# Patient Record
Sex: FEMALE | Race: WHITE | HISPANIC OR LATINO | Employment: PART TIME | URBAN - METROPOLITAN AREA
[De-identification: names, ages, dates, MRNs, and addresses within clinical notes are randomized per-mention and may not be internally consistent; named-entity substitution may affect disease eponyms.]

---

## 2020-10-30 ENCOUNTER — ANNUAL EXAM (OUTPATIENT)
Dept: OBGYN CLINIC | Facility: CLINIC | Age: 35
End: 2020-10-30
Payer: COMMERCIAL

## 2020-10-30 VITALS
TEMPERATURE: 97 F | BODY MASS INDEX: 29.41 KG/M2 | WEIGHT: 183 LBS | DIASTOLIC BLOOD PRESSURE: 70 MMHG | HEIGHT: 66 IN | SYSTOLIC BLOOD PRESSURE: 118 MMHG

## 2020-10-30 DIAGNOSIS — Z01.419 WOMEN'S ANNUAL ROUTINE GYNECOLOGICAL EXAMINATION: Primary | ICD-10-CM

## 2020-10-30 DIAGNOSIS — Z11.4 SCREENING FOR HIV (HUMAN IMMUNODEFICIENCY VIRUS): ICD-10-CM

## 2020-10-30 PROBLEM — G89.29 CHRONIC BILATERAL LOW BACK PAIN WITHOUT SCIATICA: Status: ACTIVE | Noted: 2020-02-21

## 2020-10-30 PROBLEM — M54.50 CHRONIC BILATERAL LOW BACK PAIN WITHOUT SCIATICA: Status: ACTIVE | Noted: 2020-02-21

## 2020-10-30 PROCEDURE — G0145 SCR C/V CYTO,THINLAYER,RESCR: HCPCS | Performed by: NURSE PRACTITIONER

## 2020-10-30 PROCEDURE — 99385 PREV VISIT NEW AGE 18-39: CPT | Performed by: NURSE PRACTITIONER

## 2020-10-30 PROCEDURE — 87624 HPV HI-RISK TYP POOLED RSLT: CPT | Performed by: NURSE PRACTITIONER

## 2020-10-30 RX ORDER — NAPROXEN 500 MG/1
500 TABLET ORAL 2 TIMES DAILY WITH MEALS
COMMUNITY

## 2020-11-03 LAB
HPV HR 12 DNA CVX QL NAA+PROBE: NEGATIVE
HPV16 DNA CVX QL NAA+PROBE: NEGATIVE
HPV18 DNA CVX QL NAA+PROBE: NEGATIVE

## 2020-11-05 LAB
LAB AP GYN PRIMARY INTERPRETATION: NORMAL
Lab: NORMAL

## 2020-12-27 ENCOUNTER — HOSPITAL ENCOUNTER (EMERGENCY)
Facility: HOSPITAL | Age: 35
Discharge: HOME | End: 2020-12-27
Attending: EMERGENCY MEDICINE | Admitting: EMERGENCY MEDICINE
Payer: COMMERCIAL

## 2020-12-27 ENCOUNTER — APPOINTMENT (EMERGENCY)
Dept: RADIOLOGY | Facility: HOSPITAL | Age: 35
End: 2020-12-27
Payer: COMMERCIAL

## 2020-12-27 VITALS
SYSTOLIC BLOOD PRESSURE: 130 MMHG | RESPIRATION RATE: 16 BRPM | OXYGEN SATURATION: 100 % | DIASTOLIC BLOOD PRESSURE: 70 MMHG | HEART RATE: 75 BPM | TEMPERATURE: 98.2 F

## 2020-12-27 DIAGNOSIS — S63.502A SPRAIN OF LEFT WRIST, INITIAL ENCOUNTER: Primary | ICD-10-CM

## 2020-12-27 LAB
EXT PREG TEST URINE: NEGATIVE
EXT. CONTROL ED NAV: NORMAL

## 2020-12-27 PROCEDURE — 73110 X-RAY EXAM OF WRIST: CPT

## 2020-12-27 PROCEDURE — 81025 URINE PREGNANCY TEST: CPT | Performed by: EMERGENCY MEDICINE

## 2020-12-27 PROCEDURE — 99284 EMERGENCY DEPT VISIT MOD MDM: CPT | Performed by: EMERGENCY MEDICINE

## 2020-12-27 PROCEDURE — 99284 EMERGENCY DEPT VISIT MOD MDM: CPT

## 2020-12-27 PROCEDURE — 96372 THER/PROPH/DIAG INJ SC/IM: CPT

## 2020-12-27 RX ORDER — NAPROXEN 500 MG/1
500 TABLET ORAL 2 TIMES DAILY WITH MEALS
Qty: 20 TABLET | Refills: 0 | Status: SHIPPED | OUTPATIENT
Start: 2020-12-27 | End: 2022-02-28 | Stop reason: ALTCHOICE

## 2020-12-27 RX ORDER — NAPROXEN 500 MG/1
500 TABLET ORAL 2 TIMES DAILY WITH MEALS
Qty: 20 TABLET | Refills: 0 | Status: SHIPPED | OUTPATIENT
Start: 2020-12-27 | End: 2020-12-27 | Stop reason: SDUPTHER

## 2020-12-27 RX ORDER — KETOROLAC TROMETHAMINE 30 MG/ML
15 INJECTION, SOLUTION INTRAMUSCULAR; INTRAVENOUS ONCE
Status: COMPLETED | OUTPATIENT
Start: 2020-12-27 | End: 2020-12-27

## 2020-12-27 RX ADMIN — KETOROLAC TROMETHAMINE 15 MG: 30 INJECTION, SOLUTION INTRAMUSCULAR at 14:36

## 2021-08-03 ENCOUNTER — OFFICE VISIT (OUTPATIENT)
Dept: FAMILY MEDICINE CLINIC | Facility: CLINIC | Age: 36
End: 2021-08-03
Payer: COMMERCIAL

## 2021-08-03 VITALS
DIASTOLIC BLOOD PRESSURE: 60 MMHG | OXYGEN SATURATION: 100 % | WEIGHT: 178.4 LBS | BODY MASS INDEX: 29.72 KG/M2 | HEART RATE: 84 BPM | HEIGHT: 65 IN | TEMPERATURE: 99.5 F | SYSTOLIC BLOOD PRESSURE: 100 MMHG | RESPIRATION RATE: 16 BRPM

## 2021-08-03 DIAGNOSIS — Z13.220 SCREENING FOR HYPERLIPIDEMIA: ICD-10-CM

## 2021-08-03 DIAGNOSIS — Z13.1 SCREENING FOR DIABETES MELLITUS (DM): ICD-10-CM

## 2021-08-03 DIAGNOSIS — K08.9 POOR DENTITION: ICD-10-CM

## 2021-08-03 DIAGNOSIS — Z13.0 SCREENING FOR DEFICIENCY ANEMIA: ICD-10-CM

## 2021-08-03 DIAGNOSIS — Z11.4 SCREENING FOR HIV (HUMAN IMMUNODEFICIENCY VIRUS): ICD-10-CM

## 2021-08-03 DIAGNOSIS — Z11.59 NEED FOR HEPATITIS C SCREENING TEST: ICD-10-CM

## 2021-08-03 DIAGNOSIS — Z00.00 ANNUAL PHYSICAL EXAM: Primary | ICD-10-CM

## 2021-08-03 PROCEDURE — 99385 PREV VISIT NEW AGE 18-39: CPT | Performed by: FAMILY MEDICINE

## 2021-08-03 RX ORDER — OXYMETAZOLINE HYDROCHLORIDE 0.05 G/100ML
2 SPRAY NASAL 2 TIMES DAILY
COMMUNITY

## 2021-08-03 NOTE — ASSESSMENT & PLAN NOTE
Pt needs a root canal and need to find a doctor who can preform the surgery in the hospital  Will give name of a periodontist

## 2021-08-03 NOTE — PROGRESS NOTES
[facility] PRACT[facility]    NAME: Lobo   AGE: 39 y o  SEX: female  : 1985     DATE: 8/3/2021     Assessment and Plan:     Problem List Items Addressed This Visit        Digestive    Poor dentition     Pt needs a root canal and need to find a doctor who can preform the surgery in the hospital  Will give name of a periodontist           Relevant Orders    Ambulatory referral to Periodontics    Ambulatory referral to Oral Maxillofacial Surgery       Other    Annual physical exam - Primary     Normal exam    Screening labs order  No family ho colon cancer but patient has a paternal uncle and paternal grandfather with colon cancer at age 76 and 79 respectively  Start screening at age 39  Other Visit Diagnoses     Screening for deficiency anemia        Relevant Orders    CBC and Platelet    Screening for diabetes mellitus (DM)        Relevant Orders    Comprehensive metabolic panel    Screening for hyperlipidemia        Relevant Orders    Lipid panel    Screening for HIV (human immunodeficiency virus)        Relevant Orders    HIV 1/2 Antigen/Antibody (4th Generation) w Reflex [facility]    Need for hepatitis C screening test        Relevant Orders    Hepatitis C antibody          Immunizations and preventive care screenings were discussed with patient today  Appropriate education was printed on patient's after visit summary  Pt completed the pfizer covid vaccines  Counseling:  Alcohol/drug use: discussed moderation in alcohol intake, the recommendations for healthy alcohol use, and avoidance of illicit drug use  Dental Health: discussed importance of regular tooth brushing, flossing, and dental visits  · Exercise: the importance of regular exercise/physical activity was discussed  Recommend exercise 3-5 times per week for at least 30 minutes  BMI Counseling: Body mass index is 29 69 kg/m²   The BMI is above normal  Nutrition recommendations include decreasing portion sizes, encouraging healthy choices of fruits and vegetables and limiting drinks that contain sugar  Exercise recommendations include moderate physical activity 150 minutes/week  Return in 1 year (on 8/3/2022)  Chief Complaint:     Chief Complaint   Patient presents with    New Patient Visit      History of Present Illness:     Adult Annual Physical   Patient here for a comprehensive physical exam  The patient reports problems - see separate note  Diet and Physical Activity  · Diet/Nutrition: well balanced diet  · Exercise: no formal exercise  Depression Screening  PHQ-9 Depression Screening    PHQ-9:   Frequency of the following problems over the past two weeks:      Little interest or pleasure in doing things: 0 - not at all  Feeling down, depressed, or hopeless: 0 - not at all  PHQ-2 Score: 0       General Health  · Sleep: sleeps well  · Hearing: no issues  · Vision: wears glasses  · Dental: pt needs a dentist  She has seen multiple dentists in the area but every one refuses to see her because of her prior reaction to anesthesia  She reports that during prior 2 c sections, her bp droped significantly  She is requesting a referral to a hospital      /GYN Health  · Last menstrual period: regular 7/12/21  · Contraceptive method: tubal ligation  · History of STDs?: no   · Pap was done 10/2020 and was normal with negative HPV  Review of Systems:     Review of Systems   Constitutional: Negative for fever and unexpected weight change  HENT: Negative for ear pain, sore throat and trouble swallowing  Eyes: Negative for pain and visual disturbance  Respiratory: Negative for cough, chest tightness, shortness of breath and wheezing  Cardiovascular: Negative for chest pain  Gastrointestinal: Negative for abdominal distention, abdominal pain, blood in stool, constipation, diarrhea, nausea and vomiting     Endocrine: Negative for polydipsia and polyuria  Genitourinary: Negative for dysuria and hematuria  Musculoskeletal: Negative for back pain and myalgias  Skin: Negative for rash  Neurological: Negative for syncope and headaches  Psychiatric/Behavioral: Negative for suicidal ideas  Past Medical History:     History reviewed  No pertinent past medical history  Past Surgical History:     Past Surgical History:   Procedure Laterality Date     SECTION        Social History:     Social History     Socioeconomic History    Marital status: /Civil Union     Spouse name: None    Number of children: 2    Years of education: None    Highest education level: None   Occupational History    None   Tobacco Use    Smoking status: Never Smoker    Smokeless tobacco: Never Used   Vaping Use    Vaping Use: Never used   Substance and Sexual Activity    Alcohol use: Not Currently     Comment: occasionally    Drug use: Not Currently    Sexual activity: Yes     Partners: Male   Other Topics Concern    None   Social History Narrative    None     Social Determinants of Health     Financial Resource Strain: Low Risk     Difficulty of Paying Living Expenses: Not hard at all   Food Insecurity: No Food Insecurity    Worried About Running Out of Food in the Last Year: Never true    Damien of Food in the Last Year: Never true   Transportation Needs: No Transportation Needs    Lack of Transportation (Medical): No    Lack of Transportation (Non-Medical): No   Physical Activity: Inactive    Days of Exercise per Week: 0 days    Minutes of Exercise per Session: 0 min   Stress: No Stress Concern Present    Feeling of Stress :  Only a little   Social Connections:     Frequency of Communication with Friends and Family:     Frequency of Social Gatherings with Friends and Family:     Attends Muslim Services:     Active Member of Clubs or Organizations:     Attends Club or Organization Meetings:     Marital Status: Intimate Partner Violence: Not At Risk    Fear of Current or Ex-Partner: No    Emotionally Abused: No    Physically Abused: No    Sexually Abused: No      Family History:     Family History   Problem Relation Age of Onset    Hypertension Mother     Thyroid disease Mother     Hyperlipidemia Mother     Diabetes Father     Hypertension Father     Colon cancer Paternal Grandfather     Colon cancer Paternal Uncle     No Known Problems Brother     Cancer Paternal Grandmother       Current Medications:     Current Outpatient Medications   Medication Sig Dispense Refill    oxymetazoline (Afrin 12 Hour) 0 05 % nasal spray 2 sprays by Each Nare route 2 (two) times a day      naproxen (NAPROSYN) 500 mg tablet Take 500 mg by mouth 2 (two) times a day with meals      naproxen (NAPROSYN) 500 mg tablet Take 1 tablet (500 mg total) by mouth 2 (two) times a day with meals (Patient not taking: Reported on 8/3/2021) 20 tablet 0     No current facility-administered medications for this visit  Allergies:     No Known Allergies   Physical Exam:     /60 (BP Location: Left arm, Patient Position: Sitting, Cuff Size: Adult)   Pulse 84   Temp 99 5 °F (37 5 °C) (Tympanic)   Resp 16   Ht 5' 5" (1 651 m)   Wt 80 9 kg (178 lb 6 4 oz)   LMP 07/12/2021   SpO2 100%   BMI 29 69 kg/m²     Physical Exam  Constitutional:       Appearance: She is well-developed  HENT:      Head: Normocephalic and atraumatic  Eyes:      General: No scleral icterus  Conjunctiva/sclera: Conjunctivae normal       Pupils: Pupils are equal, round, and reactive to light  Cardiovascular:      Rate and Rhythm: Normal rate and regular rhythm  Heart sounds: Normal heart sounds  No murmur heard  No friction rub  No gallop  Pulmonary:      Effort: Pulmonary effort is normal  No respiratory distress  Breath sounds: No wheezing or rales  Chest:      Chest wall: No tenderness     Abdominal:      General: Bowel sounds are normal  There is no distension  Palpations: Abdomen is soft  There is no mass  Tenderness: There is no abdominal tenderness  Musculoskeletal:         General: Normal range of motion  Cervical back: Normal range of motion and neck supple  Lymphadenopathy:      Cervical: No cervical adenopathy  Skin:     General: Skin is warm and dry  Capillary Refill: Capillary refill takes less than 2 seconds  Findings: No rash  Neurological:      Mental Status: She is alert and oriented to person, place, and time  Cranial Nerves: No cranial nerve deficit            Brian Knapp MD   [address] [place] [address]

## 2021-08-03 NOTE — ASSESSMENT & PLAN NOTE
Normal exam    Screening labs order  No family ho colon cancer but patient has a paternal uncle and paternal grandfather with colon cancer at age 76 and 79 respectively  Start screening at age 39

## 2021-08-03 NOTE — PATIENT INSTRUCTIONS

## 2021-12-29 DIAGNOSIS — Z20.822 COVID-19 RULED OUT: Primary | ICD-10-CM

## 2021-12-30 PROCEDURE — U0005 INFEC AGEN DETEC AMPLI PROBE: HCPCS | Performed by: FAMILY MEDICINE

## 2021-12-30 PROCEDURE — U0003 INFECTIOUS AGENT DETECTION BY NUCLEIC ACID (DNA OR RNA); SEVERE ACUTE RESPIRATORY SYNDROME CORONAVIRUS 2 (SARS-COV-2) (CORONAVIRUS DISEASE [COVID-19]), AMPLIFIED PROBE TECHNIQUE, MAKING USE OF HIGH THROUGHPUT TECHNOLOGIES AS DESCRIBED BY CMS-2020-01-R: HCPCS | Performed by: FAMILY MEDICINE

## 2022-01-02 NOTE — PROGRESS NOTES
Pt had a covid contact on Christmas  She had a sore throat on the 27th and 28th and tested positive on the 30th  Her symptoms resolved  She is back at work

## 2022-02-28 NOTE — PROGRESS NOTES
Assessment/Plan:    Seasonal allergic rhinitis  Patient has seasonal allergic rhinitis  No specific trigger  Worse in the past 2 weeks  Discontinue afrin nasal spray  Advised patient to start flonase nasal spray, 2 sprays in each nostril daily  Start otc allergy medication such as claritin, allergra, or zyrtec  Follow up in 1 month or sooner as needed    Tension headache  Patient having tension headaches  This may be contributed by seasonal allergic rhinitis  Will refer to physical therapy for stretching exercises  Also printed out stretching exercises patient dry  On advised patient that headaches are due to muscular tension/stress  Patient needs to stretch these out  Take Tylenol extra-strength 2 tabs t i d  p r n  for pain or discomfort  Follow-up in 1 month with PCP or sooner as needed  Diagnoses and all orders for this visit:    Tension headache  -     Ambulatory Referral to Physical Therapy; Future    Seasonal allergic rhinitis due to other allergic trigger          Subjective:   Chief Complaint   Patient presents with    Headache     Health Maintenance   Topic Date Due    Hepatitis C Screening  Never done    HIV Screening  Never done    DTaP,Tdap,and Td Vaccines (1 - Tdap) Never done    Influenza Vaccine (1) Never done    COVID-19 Vaccine (3 - Booster for Pfizer series) 12/12/2021    BMI: Followup Plan  08/03/2022    Annual Physical  08/03/2022    Depression Screening  03/02/2023    BMI: Adult  03/02/2023    Cervical Cancer Screening  10/30/2025    Pneumococcal Vaccine: Pediatrics (0 to 5 Years) and At-Risk Patients (6 to 59 Years)  Aged Out    HIB Vaccine  Aged Out    Hepatitis B Vaccine  Aged Out    IPV Vaccine  Aged Out    Hepatitis A Vaccine  Aged Out    Meningococcal ACWY Vaccine  Aged Out    HPV Vaccine  Aged Out        Patient ID: Jeramie Golden is a 39 y o  female  HPI     # [id number]    Patient presents with complaints of headaches      C/o headaches x 2 weeks  Area of pain all over  Intermittent  Lasting minutes to hours  Taking tylenol with minimal improvement  No similar episodes in the past  Works as a   No medications  The following portions of the patient's history were reviewed and updated as appropriate: allergies, current medications, past family history, past medical history, past social history, past surgical history, and problem list     Review of Systems   Constitutional: Negative for chills and fever  HENT: Positive for sinus pressure  Negative for congestion  Eyes: Negative for visual disturbance  Respiratory: Negative for cough and shortness of breath  Cardiovascular: Negative for chest pain and palpitations  Gastrointestinal: Negative for nausea and vomiting  Genitourinary: Negative for dysuria  Musculoskeletal: Negative for myalgias  Skin: Negative for rash  Neurological: Positive for headaches  Negative for dizziness  Objective:  /72 (BP Location: Left arm, Patient Position: Sitting, Cuff Size: Adult)   Pulse 79   Temp (!) 97 °F (36 1 °C) (Tympanic)   Resp 17   Ht 5' 5" (1 651 m)   Wt 83 kg (183 lb)   SpO2 98%   BMI 30 45 kg/m²      Physical Exam  Vitals and nursing note reviewed  Constitutional:       General: She is not in acute distress  HENT:      Head: Normocephalic and atraumatic  Comments: Ethmoid sinus pressure on palpation  Eyes:      Conjunctiva/sclera: Conjunctivae normal    Cardiovascular:      Rate and Rhythm: Normal rate and regular rhythm  Pulses: Normal pulses  Heart sounds: No murmur heard  Pulmonary:      Effort: Pulmonary effort is normal  No respiratory distress  Breath sounds: No wheezing, rhonchi or rales  Musculoskeletal:         General: No swelling  Comments: Hypertonic trapezius muscles and paraspinal muscles in the upper thoracic and cervical regions   Lymphadenopathy:      Cervical: No cervical adenopathy  Neurological:      Mental Status: She is alert  This note has been constructed using a voice recognition system  There may be translation, syntax, or grammatical errors  If you have an questions, please contact the dictating provider

## 2022-03-02 ENCOUNTER — OFFICE VISIT (OUTPATIENT)
Dept: FAMILY MEDICINE CLINIC | Facility: CLINIC | Age: 37
End: 2022-03-02
Payer: COMMERCIAL

## 2022-03-02 VITALS
RESPIRATION RATE: 17 BRPM | DIASTOLIC BLOOD PRESSURE: 72 MMHG | OXYGEN SATURATION: 98 % | HEART RATE: 79 BPM | SYSTOLIC BLOOD PRESSURE: 110 MMHG | WEIGHT: 183 LBS | TEMPERATURE: 97 F | BODY MASS INDEX: 30.49 KG/M2 | HEIGHT: 65 IN

## 2022-03-02 DIAGNOSIS — G44.209 TENSION HEADACHE: Primary | ICD-10-CM

## 2022-03-02 DIAGNOSIS — J30.89 SEASONAL ALLERGIC RHINITIS DUE TO OTHER ALLERGIC TRIGGER: ICD-10-CM

## 2022-03-02 PROBLEM — T78.40XA ALLERGIC: Status: ACTIVE | Noted: 2022-03-02

## 2022-03-02 PROBLEM — J30.2 SEASONAL ALLERGIC RHINITIS: Status: ACTIVE | Noted: 2022-03-02

## 2022-03-02 PROCEDURE — 99213 OFFICE O/P EST LOW 20 MIN: CPT | Performed by: FAMILY MEDICINE

## 2022-03-02 NOTE — PATIENT INSTRUCTIONS
Please start flonase nasal spray and over the counter allergy medication like zyrtec, clartin or allergra and take daily  You may take tylenol extra strength 500 mg 2 tabs three times a day as needed for headache or pain  Dolor de perico tensional, cuidados ambulatorios   INFORMACIÓN GENERAL:   Un dolor de perico tensional  por lo general es causado por tensión muscular en cavazos perico o alberto que puede durar 30 minutos hasta varios días  A pesar de que el dolor es incómodo, los radha de perico tensionales por lo general no causan ningún problema wong  Lo siguiente puede causar tensión muscular y desencadenar un dolor de perico tensional:  · La fatiga ocular o melisa francisco postura    · Problemas de la mandíbula o dentales tigre melisa de la articulación temporomandibular, apretar la mandíbula o rechinar los dientes    · Actividades que causan cavaozs perico se mantenga en melisa posición por mucho tiempo    · Pasarse melisa comida    · No dormir lo suficiente, apnea de sueño (periodos cortos de falta de respiración)     · Sensibilidad a los alimentos, tigre el gluten  Síntomas comunes incluyen los siguientes:   · Dolor sordo y vinod sobre davian ojos y en la parte trasera de cavazos perico    · Dolor de perico que se empeora a medida que avanza el día     · Dolor que se propaga sobre toda cavazos perico al igual que cavazos alberto y hombros    · Rigidez de los músculos del alberto y los hombros    · Dolor de perico que se empeora con la fawad brillante o ruidos duros  Busque atención inmediata al presentar los siguientes síntomas:   · Un dolor de perico repentino que parece diferente o mucho peor     · Dificultad para lázaro, hablar o al [employer]    · Confusión o melisa sensación tigre si se fuera a desmayar    · Dolor de [place], fiebre o rigidez en el alberto  El tratamiento para el dolor de perico tensional  depende de la causa de cavazos dolor de [place]  Es posible que necesite medicamentos para disminuir cavazos dolor de [place]   [address] instrucciones  También podría necesitar acudir al proveedor de suzi que se especializa en problemas de espalda, músculos o mandíbula  Lidiar con mis síntomas:   · Lleve un registro de los radha de [place]  [place] Blonder cuando [place] y termino cavazos dolor de [place]  Incluya los síntomas y lo que estaba haciendo cuando empezó el dolor de [place]  Escriba en el registro lo que usted comió o bebió bella las 24 horas previas al inicio de cavazos dolor de [place]  Delores Bennett dolor y dónde le duele  Registre lo que hizo para tratar cavazos dolor de perico y si funciono o no  · Aplique calor  en cavazos perico bella 20 a 30 minutos cada 2 horas por los [employer] indicaron  El calor ayuda a disminuir el dolor y los espasmos musculares  Se puede alternar entre el calor y el hielo  · Aplique hielo  en cavazos perico por 15 a 20 minutos cada hora según las indicaciones  Use melisa compresa de hielo o coloque hielo triturado en melisa bolsa plástica y cúbrala con melisa toalla  El hielo disminuye el dolor  Prevenir un dolor de perico tensional:   · Evite la tensión muscular  No se quede en melisa posición por [employer]  Use melisa almohada diferente si se despierta con el alberto o los músculos de los hombros adoloridos  Busque formas para relajar davian músculos, tigre un masaje o descansar en silencio en melisa habitación oscura  · Evite la fatiga ocular  Asegúrese que tiene buena fawad al leer, cocer o realizar actividades parecidas  Acuda a que [employer] años y use gafas según le indicaron  · Consuma melisa variedad de alimentos saludables  Los alimentos saludables incluyen frutas, verduras, panes integrales, productos lácteos descremados, frijoles, carne magra y pescado  Evite alimentos que le producen el dolor de [place]  · Realice actividades físicas con regularidad  El ejercicio ayuda a disminuir el estrés y los radha de [place]  Consulte sobre cual es el mejor plan de ejercicios para usted  · No consuma alcohol  El alcohol puede desencadenar un dolor de [place]  El alcohol puede también interferir con los medicamentos que se usan para tratar cavazos dolor de [place]  · No fume  Si usted fuma, nunca es tarde para dejar de fumar  El humo del tabaco puede desencadenar un dolor de [place]  Solicite más información si necesita ayuda para dejar de fumar  Acuda a cavazos aurelia de control con cavazos proveedor de suzi según le indicaron:  Lleve cavazos registro de los radha de perico cuando acuda a la aurelia con cavazos proveedor de suzi  Escriba las preguntas que tenga para que no olvide hacerlas bella las citas médicas  ACUERDOS SOBRE CAVAZOS CUIDADO:   Usted tiene el derecho de participar en la planificación de cavazos cuidado  Aprenda todo lo que pueda sobre cavazos condición y tigre darle tratamiento  Discuta con davian médicos davian opciones de tratamiento para juntos decidir el cuidado que usted quiere recibir  Usted siempre tiene el derecho a rechazar cavazos tratamiento  Esta información es sólo para uso en educación  Cavazos intención no es darle un consejo médico sobre enfermedades o tratamientos  Colsulte con cavazos Chely Poncho farmacéutico antes de seguir cualquier régimen médico para saber si es seguro y efectivo para usted  © 2014 [address] is for End User's use only and may not be sold, redistributed or otherwise used for commercial purposes  All illustrations and images included in CareNotes® are the copyrighted property of A D A M , Inc  or Moe Moss  Ejercicios para el alberto   CUIDADO AMBULATORIO:   Los ejercicios para el alberto ayudan a reducir el dolor de alberto y, al [employer], lo fortalecen y mejoran cavazos movimiento  Los ejercicios para el alberto también ayudan a prevenir problemas de alberto a Sarah Jetty  Lo que necesita saber acerca de los ejercicios para el alberto:  · Ryne los ejercicios a diario o con la frecuencia indicada por cavazos médico     · Muévase lentamente, con cuidado y suavemente   Evite movimientos rápidos o bruscos  · Póngase de pie y siéntese de la forma que cavazos médico le ha [place]  La buena postura puede llegar a reducir cavazos dolor de alberto  Revise cavazos postura con frecuencia, aún cuando no esté haciendo davian ejercicios de alberto  Cómo realizar ejercicios para el alberto de manera david:  · Posición de ejercicio: Puede sentarse o estar parado mientras realiza los ejercicios para el alberto  [place] de frente  Los hombros deben estar rectos y [place], con [facility]  · Inclinación de Pernell Chavo y [place] atrás: Incline cavazos perico suavemente tratando que cavazos mentón toque cavazos pecho  Cavazos médico puede incluso pedirle que empuje la parte de atrás de cavazos alberto para ayudar a inclinar cavazos perico  Levante cavazos barbilla hasta la posición inicial  Incline cavazos perico hacia atrás lo más que pueda de [place] que quede mirando hacia el matheus raso  Es posible que cavazos médico le pida que levante el mentón para así ayudar a inclinar cavazos Maribel Mount atrás  Regrese cavazos perico a la posición inicial          · Inclinaciones de perico, de lado a lado: Incline cavazos perico de manera que cavazos oreja quede cerca de cavazos hombro  Luego incline cavazos perico hacia cavazos otro hombro  · Giros de perico: Gire cavazos perico tigre si fuera a mirar sobre el hombro  Incline cavazos mentón hacia abajo y trate de que toque cavazos hombro  No levante cavazos hombro hasta que toque cavazos mentón  [place]bey de frente otra vez  Ryne lo mismo del otro lado  · Giros de perico: Despacio, lleve la barbilla hacia el pecho  A continuación, gire la perico hacia la derecha  La oreja debe quedar ubicada por encima del hombro  Mantenga esta posición por 5 segundos  Gire la perico otra vez hacia el pecho y, [place], hacia la izquierda a la misma posición  Sostenga está posición por 5 segundos  Con cuidado, gire la perico hacia atrás en círculo en el sentido de las manecillas del [place] [address] y repita 3 veces   A continuación, mueve la perico en la dirección contraria (en el sentido contrario de las manecillas del reloj) en círculo 3 veces  No encoja los hombros hacia arriba mientras realiza derrell ejercicio  Comuníquese con cavazos médico si:  · Cavazos dolor no mejora o [place]  · Usted tiene preguntas o inquietudes acerca de cavazos afección, cuidado o programa de ejercicios  © Copyright [employer] [facility] 2022 Information is for End User's use only and may not be sold, redistributed or otherwise used for commercial purposes  All illustrations and images included in CareNotes® are the copyrighted property of [employer]  or [address] [place] [address] es sólo para uso en educación  Cavazos intención no es darle un consejo médico sobre enfermedades o tratamientos  Colsulte con cavazos Selam Otto farmacéutico antes de seguir cualquier régimen médico para saber si es seguro y efectivo para usted

## 2022-03-02 NOTE — ASSESSMENT & PLAN NOTE
Patient has seasonal allergic rhinitis  No specific trigger  Worse in the past 2 weeks  Discontinue afrin nasal spray  Advised patient to start flonase nasal spray, 2 sprays in each nostril daily  Start otc allergy medication such as claritin, allergra, or zyrtec  Follow up in 1 month or sooner as needed

## 2022-03-02 NOTE — ASSESSMENT & PLAN NOTE
Patient having tension headaches  This may be contributed by seasonal allergic rhinitis  Will refer to physical therapy for stretching exercises  Also printed out stretching exercises patient dry  On advised patient that headaches are due to muscular tension/stress  Patient needs to stretch these out  Take Tylenol extra-strength 2 tabs t i d  p r n  for pain or discomfort  Follow-up in 1 month with PCP or sooner as needed

## 2022-03-08 ENCOUNTER — EVALUATION (OUTPATIENT)
Dept: PHYSICAL THERAPY | Facility: CLINIC | Age: 37
End: 2022-03-08
Payer: COMMERCIAL

## 2022-03-08 DIAGNOSIS — M54.2 CERVICAL PAIN: Primary | ICD-10-CM

## 2022-03-08 DIAGNOSIS — G44.209 TENSION HEADACHE: ICD-10-CM

## 2022-03-08 PROCEDURE — 97161 PT EVAL LOW COMPLEX 20 MIN: CPT

## 2022-03-08 PROCEDURE — 97140 MANUAL THERAPY 1/> REGIONS: CPT

## 2022-03-08 PROCEDURE — 97110 THERAPEUTIC EXERCISES: CPT

## 2022-03-08 NOTE — PROGRESS NOTES
PT Evaluation     Today's date: 3/9/2022  Patient name: Thu Valdez  : 1985  MRN: [id number]  Referring provider: Samuel Lopez DO  Dx:   Encounter Diagnosis     ICD-10-CM    1  Cervical pain  M54 2 PT plan of care cert/re-cert   2  Tension headache  G44 209 Ambulatory Referral to Physical Therapy     PT plan of care cert/re-cert       Start Time: 1800  Stop Time: 1855  Total time in clinic (min): 55 minutes    Assessment  Assessment details: Patient presents to outpatient physical therapy with cervical pain and headache  She presents with impairments of decreased cervical ROM, decreased joint mobility in cervical and thoracic spins, pain, decreased scapular stability and strength, impaired posture, muscle hypertonicity and decreased flexibility, and decreased cervical strength  Patient presents with decreased strength at middle trapezius and lower trapezius, as well as decrease DNF endurance  Patient would benefit from skilled physical therapy to decrease pain, increase functional ROM, improve function, improve QOL and return to prior level of functioning  Impairments: abnormal muscle tone, abnormal or restricted ROM, impaired physical strength, lacks appropriate home exercise program, pain with function and poor posture   Understanding of Dx/Px/POC: good   Prognosis: good    Goals  ST  Decrease subjective pain to 6/10 at worst in 4 weeks to improve function  2  Improve cervical ROM to WNL within 4 weeks   3  Improve scapular strength to 4-/5 to improve posture and decrease strain on cervical musculature in 4 weeks    LT  Decrease subjective pain to 3/10 at worst in 8 weeks to improve function  2  Improve scapular strength to 4/5  to improve posture and decrease strain on cervical musculature in 8 weeks  3   Patient will be independent in [facility]  Patient would benefit from: skilled physical therapy  Planned therapy interventions: joint mobilization, manual therapy, massage, neuromuscular re-education, patient education, postural training, strengthening, stretching, flexibility, therapeutic activities, therapeutic exercise and home exercise program  Frequency: 2x week  Duration in visits: 8  Plan of Care beginning date: 3/8/2022  Plan of Care expiration date: 5/3/2022  Treatment plan discussed with: patient        Subjective Evaluation    History of Present Illness  Mechanism of injury: Patient reports that she feels pain in the back of the neck and back of the head, sometimes causing headaches  Patient reports it started about 2 months ago  She states that she had she had this pain awhile ago, went away and has returned and has come and gone for a few years  Headaches occurring have been getting better, but the pain in the neck continues  She states that if she needs to do a lot of things and hurry, more stress makes is worse  "doing too many things at a time" makes the pain worse  She states the doctor stated that her seasonal allergies may be contributing to the pain, has been taking allergy medication (Claritin and nose spray), has helped the headaches  No HA in the past 2 weeks  Sometimes feels terrible, and sometimes more uncomfortable  Increased pain if she does not sleep well  Works as a   Pain  Current pain ratin  At best pain ratin  At worst pain ratin  Relieving factors: relaxation and rest  Aggravating factors: sitting          Objective     Concurrent Complaints  Positive for headaches  Negative for night pain, disturbed sleep, dizziness, faints and nausea/motion sickness    Additional Special Questions  No numbness or tingling or pain in UE's    Palpation   Left   Hypertonic in the scalenes, suboccipitals and upper trapezius  Tenderness of the scalenes, suboccipitals and upper trapezius  Right   Hypertonic in the scalenes, suboccipitals and upper trapezius  Tenderness of the scalenes, suboccipitals and upper trapezius  Tenderness   Cervical Spine   Tenderness in the facet joint and spinous process  Active Range of Motion     Additional Active Range of Motion Details  Flexion WNL  Extension WNL  R SB: 20 degrees pain  L SB: 18 degrees pain  R rotation: 60 degrees  L rotation: 55 degrees, pain    Joint Play     Hypomobile: C3, C4, C5, C6, C7, T1, T2, T3, T4, T5 and T6     Pain: C3, C4, C5, C6, C7, T1, T2, T3, T4, T5 and T6     Strength/Myotome Testing     Left Shoulder     Isolated Muscles   Lower trapezius: 3-   Middle trapezius: 3+     Right Shoulder     Isolated Muscles   Lower trapezius: 3-   Middle trapezius: 3+     Tests   Cervical   Positive neck flexor muscle endurance test   Negative cervical distraction  Additional Tests Details  DNF endurance 7 seconds, significant accessory muscle activation  Neuro Exam:     Headaches   Patient reports headaches: Yes  Posture assessment: Patient with FHP, rounded shoulders, excessive upper cervical extension, decreased thoracic kyphosis, increased kyphosis at upper thoracic spine  Precautions: none      Manuals 3/8             cervical distraction, SOR, jt mobs lat glide C4-C6 bilateral                                                   Neuro Re-Ed             Chin tuck with retraction 5" x 10                                                                                          Ther Ex             Upper trap stretch 30" x 3            Lev scap stretch 30" x 3                                                                                          Ther Activity                                       Gait Training                                       Modalities                                           Access Code: [email or web address]  URL: [email or web address]  Date: 03/08/2022  Prepared by: Cyndi Galan    Exercises  · Seated Levator Scapulae Stretch - 2 x daily - 7 x weekly - 3 sets - 30 hold  · Seated Upper Trapezius Stretch - 2 x daily - 7 x weekly - 3 sets - 30 hold  · Supine Chin Tuck - 2 x daily - 7 x weekly - 2 sets - 10 reps

## 2022-03-15 ENCOUNTER — OFFICE VISIT (OUTPATIENT)
Dept: PHYSICAL THERAPY | Facility: CLINIC | Age: 37
End: 2022-03-15
Payer: COMMERCIAL

## 2022-03-15 DIAGNOSIS — G44.209 TENSION HEADACHE: ICD-10-CM

## 2022-03-15 DIAGNOSIS — M54.2 CERVICAL PAIN: Primary | ICD-10-CM

## 2022-03-15 PROCEDURE — 97110 THERAPEUTIC EXERCISES: CPT

## 2022-03-15 PROCEDURE — 97140 MANUAL THERAPY 1/> REGIONS: CPT

## 2022-03-15 PROCEDURE — 97112 NEUROMUSCULAR REEDUCATION: CPT

## 2022-03-15 NOTE — PROGRESS NOTES
Daily Note     Today's date: 3/15/2022  Patient name: Dakotah Eaton  : 1985  MRN: [id number]  Referring provider: Natali Lopez DO  Dx:   Encounter Diagnosis     ICD-10-CM    1  Cervical pain  M54 2    2  Tension headache  G44 209        Start Time: 1700  Stop Time: 1800  Total time in clinic (min): 60 minutes    Subjective: Patient reports she was having more pain after the initial session, felt better after that  She states that the pain is better  She states that she had a lot of pain when trying to sleep the night after she came for therapy  She states that she has pain when looking R or L, not as bad when looking up and down  Objective: See treatment diary below      Assessment: Tolerated treatment well  Patient demonstrated fatigue post treatment, exhibited good technique with therapeutic exercises and would benefit from continued PT  Patient with sensitivity with MFR and joint mobs to cervical spine  Decreased pain with cervical retraction  Trialed IASTM to bilateral upper traps, tolerated well  Patient reported decreased pain post session, fatigue in neck  Requires cues to decreased upper trap activation with rows  Continue to progress as tolerated  Plan: Continue per plan of care        Precautions: none      Manuals 3/8 3/15            cervical distraction, SOR, jt mobs lat glide C4-C6 bilateral Cervical distraction, SOR, jt mobs lat glide R C4-C6 grade II    Gentle IASTM to bilateral UT in sitting                                                  Neuro Re-Ed             Chin tuck with retraction 5" x 10            rows  Org TB x 15 reps           Low rows             Cervical retraction in sitting  5" x 10                                                  Ther Ex             Upper trap stretch 30" x 3 30" x 3 each           Lev scap stretch 30" x 3 30" x 3 each           Prone I, T, W             Upper thoracic extension seated  5" hold, 2 x 10           Open book             Cervical SNAGS 5" hold x 10 each           SCM stretch  30" x 2 (sidebend with opposite rotation)                        Ther Activity                                       Gait Training                                       Modalities                                         Access Code: [email or web address]  URL: [email or web address]  Date: 03/15/2022  Prepared by: Desiree Servant    Exercises  · Seated Levator Scapulae Stretch - 2 x daily - 7 x weekly - 3 sets - 30 hold  · Seated Upper Trapezius Stretch - 2 x daily - 7 x weekly - 3 sets - 30 hold  · Supine Chin Tuck - 2 x daily - 7 x weekly - 2 sets - 10 reps  · Seated Assisted Cervical Rotation with Towel - 2 x daily - 7 x weekly - 10 reps - 5 hold  · Seated Thoracic Lumbar Extension with Pectoralis Stretch - 2 x daily - 7 x weekly - 2 sets - 10 reps  · Sternocleidomastoid Stretch - 2 x daily - 7 x weekly - 3 sets - 30 hold  · Seated Cervical Retraction - 1 x daily - 7 x weekly - 2 sets - 10 reps - 5 hold

## 2022-03-16 ENCOUNTER — OFFICE VISIT (OUTPATIENT)
Dept: PHYSICAL THERAPY | Facility: CLINIC | Age: 37
End: 2022-03-16
Payer: COMMERCIAL

## 2022-03-16 DIAGNOSIS — M54.2 CERVICAL PAIN: ICD-10-CM

## 2022-03-16 DIAGNOSIS — G44.209 TENSION HEADACHE: Primary | ICD-10-CM

## 2022-03-16 PROCEDURE — 97112 NEUROMUSCULAR REEDUCATION: CPT

## 2022-03-16 PROCEDURE — 97140 MANUAL THERAPY 1/> REGIONS: CPT

## 2022-03-16 PROCEDURE — 97110 THERAPEUTIC EXERCISES: CPT

## 2022-03-16 NOTE — PROGRESS NOTES
Daily Note     Today's date: 3/16/2022  Patient name: Noble Lovett  : 1985  MRN: [id number]  Referring provider: Jered Lopez DO  Dx:   Encounter Diagnosis     ICD-10-CM    1  Tension headache  G44 209    2  Cervical pain  M54 2                   Subjective: Patient reports she felt better after yesterday's session, states she slept well last night  Some soreness    Objective: See treatment diary below      Assessment: Tolerated treatment well  Patient demonstrated fatigue post treatment, exhibited good technique with therapeutic exercises and would benefit from continued PT  Patient continues to have sensitivity with MFR and joint mobs to cervical spine, slight decrease at L side compared to previous session  Sensitivity with IASTM to upper trap and lev scap bilaterally with moderate restrictions  Continues to require tactile cues to avoid shoulder shrug with exercises and with avoid looking down with exercises  Continue to progress as tolerated  Plan: Continue per plan of care        Precautions: none      Manuals 3/8 3/15 3/16         cervical distraction, SOR, jt mobs lat glide C4-C6 bilateral Cervical distraction, SOR, jt mobs lat glide R C4-C6 grade II    Gentle IASTM to bilateral UT in sitting Cervical distraction, jt mobs lat glide R C4-C6 grade II    Gentle IASTM to bilateral UT and lev scap in sitting                                         Neuro Re-Ed           Chin tuck with retraction 5" x 10          rows  Org TB x 15 reps Org TB x 20        TB diagonals   D2 ext org TB x 10 each        Low rows   Org TB x 20        Cervical retraction in sitting  5" x 10                                          Ther Ex           Upper trap stretch 30" x 3 30" x 3 each 30" x 3 each        Lev scap stretch 30" x 3 30" x 3 each 30" x 3 each        Prone I, T, W   Prone I's 3" hold x 15    T's 3" hold x 15 reps        Upper thoracic extension seated  5" hold, 2 x 10         Open book   5" x 10 each side Cervical SNAGS  5" hold x 10 each         SCM stretch  30" x 2 (sidebend with opposite rotation) 30" x 3 each                   Ther Activity                                 Gait Training                                 Modalities                                   Access Code: [email or web address]  URL: [email or web address]  Date: 03/15/2022  Prepared by: Cyndi Galan    Exercises  · Seated Levator Scapulae Stretch - 2 x daily - 7 x weekly - 3 sets - 30 hold  · Seated Upper Trapezius Stretch - 2 x daily - 7 x weekly - 3 sets - 30 hold  · Supine Chin Tuck - 2 x daily - 7 x weekly - 2 sets - 10 reps  · Seated Assisted Cervical Rotation with Towel - 2 x daily - 7 x weekly - 10 reps - 5 hold  · Seated Thoracic Lumbar Extension with Pectoralis Stretch - 2 x daily - 7 x weekly - 2 sets - 10 reps  · Sternocleidomastoid Stretch - 2 x daily - 7 x weekly - 3 sets - 30 hold  · Seated Cervical Retraction - 1 x daily - 7 x weekly - 2 sets - 10 reps - 5 hold

## 2022-03-24 ENCOUNTER — APPOINTMENT (OUTPATIENT)
Dept: PHYSICAL THERAPY | Facility: CLINIC | Age: 37
End: 2022-03-24
Payer: COMMERCIAL

## 2022-03-25 ENCOUNTER — OFFICE VISIT (OUTPATIENT)
Dept: PHYSICAL THERAPY | Facility: CLINIC | Age: 37
End: 2022-03-25
Payer: COMMERCIAL

## 2022-03-25 DIAGNOSIS — M54.2 CERVICAL PAIN: Primary | ICD-10-CM

## 2022-03-25 DIAGNOSIS — G44.209 TENSION HEADACHE: ICD-10-CM

## 2022-03-25 PROCEDURE — 97112 NEUROMUSCULAR REEDUCATION: CPT

## 2022-03-25 PROCEDURE — 97110 THERAPEUTIC EXERCISES: CPT

## 2022-03-25 PROCEDURE — 97140 MANUAL THERAPY 1/> REGIONS: CPT

## 2022-03-25 NOTE — PROGRESS NOTES
Daily Note     Today's date: 3/25/2022  Patient name: Stormy Burden  : 1985  MRN: [id number]  Referring provider: Ana Poster John,   Dx:   Encounter Diagnosis     ICD-10-CM    1  Cervical pain  M54 2    2  Tension headache  G44 209        Start Time: 1240  Stop Time: 1330  Total time in clinic (min): 50 minutes    Subjective: Patient reports she is feeling better, mostly discomfort on the R side  The stretches has been helping  Objective: See treatment diary below      Assessment: Tolerated treatment well  Patient demonstrated fatigue post treatment, exhibited good technique with therapeutic exercises and would benefit from continued PT  Patient with improved tolerance to joint mobilization and MFR  Improving joint mobility at cervical spine  Added additional postural exercises and scapular strengthening/neuromuscular control  Continue to progress as tolerated  Patient continues to have sensitivity with MFR and joint mobs to cervical spine, slight decrease at L side compared to previous session  Sensitivity with IASTM to upper trap and lev scap bilaterally with moderate restrictions  Continues to require tactile cues to avoid shoulder shrug with exercises and with avoid looking down with exercises  Continue to progress as tolerated  Plan: Continue per plan of care        Precautions: none      Manuals 3/8 3/15 3/16 3/25        cervical distraction, SOR, jt mobs lat glide C4-C6 bilateral Cervical distraction, SOR, jt mobs lat glide R C4-C6 grade II    Gentle IASTM to bilateral UT in sitting Cervical distraction, jt mobs lat glide R C4-C6 grade II    Gentle IASTM to bilateral UT and lev scap in sitting Cervical distraction, jt mobs R C4-C6 grade III    Gentle IASTM to R UT and lev scap in sitting                                        Neuro Re-Ed           Chin tuck with retraction 5" x 10   Seated 5" 2 x 10       rows  Org TB x 15 reps Org TB x 20        TB diagonals   D2 ext org TB x 10 each Org TB 2 x 10       Low rows   Org TB x 20        Cervical retraction in sitting  5" x 10         Foam roller on wall roll ups    2 x 10       No money    Org 2 x 10       scap stab    Org TB at wrist 90 degree raise 2 x 10       Ther Ex           Upper trap stretch 30" x 3 30" x 3 each 30" x 3 each        Lev scap stretch 30" x 3 30" x 3 each 30" x 3 each        Prone I, T, W   Prone I's 3" hold x 15    T's 3" hold x 15 reps        Upper thoracic extension seated  5" hold, 2 x 10  5" hold 2 x 10       Open book   5" x 10 each side        Cervical SNAGS  5" hold x 10 each         SCM stretch  30" x 2 (sidebend with opposite rotation) 30" x 3 each        Thread the needle    x 10 each       Ther Activity                                 Gait Training                                 Modalities                                   Access Code: [email or web address]  URL: [email or web address]  Date: 03/15/2022  Prepared by: Ashley Dumont    Exercises  · Seated Levator Scapulae Stretch - 2 x daily - 7 x weekly - 3 sets - 30 hold  · Seated Upper Trapezius Stretch - 2 x daily - 7 x weekly - 3 sets - 30 hold  · Supine Chin Tuck - 2 x daily - 7 x weekly - 2 sets - 10 reps  · Seated Assisted Cervical Rotation with Towel - 2 x daily - 7 x weekly - 10 reps - 5 hold  · Seated Thoracic Lumbar Extension with Pectoralis Stretch - 2 x daily - 7 x weekly - 2 sets - 10 reps  · Sternocleidomastoid Stretch - 2 x daily - 7 x weekly - 3 sets - 30 hold  · Seated Cervical Retraction - 1 x daily - 7 x weekly - 2 sets - 10 reps - 5 hold

## 2022-03-29 ENCOUNTER — APPOINTMENT (OUTPATIENT)
Dept: PHYSICAL THERAPY | Facility: CLINIC | Age: 37
End: 2022-03-29
Payer: COMMERCIAL

## 2022-03-30 ENCOUNTER — OFFICE VISIT (OUTPATIENT)
Dept: PHYSICAL THERAPY | Facility: CLINIC | Age: 37
End: 2022-03-30
Payer: COMMERCIAL

## 2022-03-30 DIAGNOSIS — G44.209 TENSION HEADACHE: Primary | ICD-10-CM

## 2022-03-30 DIAGNOSIS — M54.2 CERVICAL PAIN: ICD-10-CM

## 2022-03-30 PROCEDURE — 97112 NEUROMUSCULAR REEDUCATION: CPT

## 2022-03-30 PROCEDURE — 97140 MANUAL THERAPY 1/> REGIONS: CPT

## 2022-03-30 PROCEDURE — 97150 GROUP THERAPEUTIC PROCEDURES: CPT

## 2022-03-30 NOTE — PROGRESS NOTES
Daily Note     Today's date: 3/30/2022  Patient name: Nela Chou  : 1985  MRN: [id number]  Referring provider: Andreea Lopez DO  Dx:   Encounter Diagnosis     ICD-10-CM    1  Tension headache  G44 209    2  Cervical pain  M54 2        Start Time: 1030  Stop Time: 1120  Total time in clinic (min): 50 minutes    Subjective: Patient reports she feels discomfort in the R side of the neck  The stretches have been helping    Objective: See treatment diary below  1:1 with PT from [id number], [id number]  Group from [id number]  Self directed exercise from [id number]      Assessment: Tolerated treatment well  Patient demonstrated fatigue post treatment, exhibited good technique with therapeutic exercises and would benefit from continued PT  Patient with significant tenderness at bilateral upper trap with IASTM  Decreased pain post manual therapy  Plan: Continue per plan of care        Precautions: none      Manuals 3/8 3/15 3/16 3/25 3/30     cervical distraction, SOR, jt mobs lat glide C4-C6 bilateral Cervical distraction, SOR, jt mobs lat glide R C4-C6 grade II    Gentle IASTM to bilateral UT in sitting Cervical distraction, jt mobs lat glide R C4-C6 grade II    Gentle IASTM to bilateral UT and lev scap in sitting Cervical distraction, jt mobs R C4-C6 grade III    Gentle IASTM to R UT and lev scap in sitting IASTM to R UE and lev scap in sitting    jt mobs L T3-T6, L rhomboids    MET for elevated R 1st rib                               Neuro Re-Ed         Chin tuck with retraction 5" x 10   Seated 5" 2 x 10 supine 5" 2 x 10    Chin tuck with DNF 5" 2 x 10    rows  Org TB x 15 reps Org TB x 20  Green TB x 20    TB diagonals   D2 ext org TB x 10 each Org TB 2 x 10 Org TB 2 x 10    Low rows   Org TB x 20  Green TB 2 x 10    Cervical retraction in sitting  5" x 10       Foam roller on wall roll ups    2 x 10 X 20    No money    Org 2 x 10     scap stab    Org TB at wrist 90 degree raise 2 x 10 Org TB at wrist 90 degree raise 2 x 10    Ther Ex         Upper trap stretch 30" x 3 30" x 3 each 30" x 3 each  30" x 3 R    Lev scap stretch 30" x 3 30" x 3 each 30" x 3 each      Prone I, T, W   Prone I's 3" hold x 15    T's 3" hold x 15 reps      Upper thoracic extension seated  5" hold, 2 x 10  5" hold 2 x 10     Open book   5" x 10 each side      Cervical SNAGS  5" hold x 10 each       SCM stretch  30" x 2 (sidebend with opposite rotation) 30" x 3 each      Thread the needle    x 10 each X 20 each    Ther Activity                           Gait Training                           Modalities                             Access Code: [email or web address]  URL: [email or web address]  Date: 03/15/2022  Prepared by: Sanjay Jeffery    Exercises  · Seated Levator Scapulae Stretch - 2 x daily - 7 x weekly - 3 sets - 30 hold  · Seated Upper Trapezius Stretch - 2 x daily - 7 x weekly - 3 sets - 30 hold  · Supine Chin Tuck - 2 x daily - 7 x weekly - 2 sets - 10 reps  · Seated Assisted Cervical Rotation with Towel - 2 x daily - 7 x weekly - 10 reps - 5 hold  · Seated Thoracic Lumbar Extension with Pectoralis Stretch - 2 x daily - 7 x weekly - 2 sets - 10 reps  · Sternocleidomastoid Stretch - 2 x daily - 7 x weekly - 3 sets - 30 hold  · Seated Cervical Retraction - 1 x daily - 7 x weekly - 2 sets - 10 reps - 5 hold

## 2022-04-01 ENCOUNTER — OFFICE VISIT (OUTPATIENT)
Dept: PHYSICAL THERAPY | Facility: CLINIC | Age: 37
End: 2022-04-01
Payer: COMMERCIAL

## 2022-04-01 DIAGNOSIS — G44.209 TENSION HEADACHE: Primary | ICD-10-CM

## 2022-04-01 DIAGNOSIS — M54.2 CERVICAL PAIN: ICD-10-CM

## 2022-04-01 PROCEDURE — 97112 NEUROMUSCULAR REEDUCATION: CPT

## 2022-04-01 PROCEDURE — 97140 MANUAL THERAPY 1/> REGIONS: CPT

## 2022-04-01 PROCEDURE — 97110 THERAPEUTIC EXERCISES: CPT

## 2022-04-01 NOTE — PROGRESS NOTES
Daily Note     Today's date: 2022  Patient name: Erlin Alan  : 1985  MRN: [id number]  Referring provider: Rhiannon Lopez DO  Dx:   Encounter Diagnosis     ICD-10-CM    1  Tension headache  G44 209    2  Cervical pain  M54 2                   Subjective: Patient reports she has discomfort in the neck, improving with the exercises and stretches  Objective: See treatment diary below      Assessment: Tolerated treatment well  Patient demonstrated fatigue post treatment, exhibited good technique with therapeutic exercises and would benefit from continued PT  Patient with significant tenderness at R C6-C7, slight decrease with joint mobilizations  Continues to require cues for decreased upper trap activation and decreased upper cervical extension during exercises  Continue to progress as tolerated  Plan: Continue per plan of care        Precautions: none      Manuals 3/8 3/15 3/16 3/25 3/30 4/1    cervical distraction, SOR, jt mobs lat glide C4-C6 bilateral Cervical distraction, SOR, jt mobs lat glide R C4-C6 grade II    Gentle IASTM to bilateral UT in sitting Cervical distraction, jt mobs lat glide R C4-C6 grade II    Gentle IASTM to bilateral UT and lev scap in sitting Cervical distraction, jt mobs R C4-C6 grade III    Gentle IASTM to R UT and lev scap in sitting IASTM to R UE and lev scap in sitting    jt mobs L T3-T6, L rhomboids    MET for elevated R 1st rib IASTM to R UT and lev scap in sitting  Joint mobs T4-T5 CPA grade III,  R sideglide C4-C6 grade II/III, L C5 grade III                              Neuro Re-Ed         quadruped push up with plus      In quadruped, push plus with cervical retraction 2 x 10   Chin tuck with retraction 5" x 10   Seated 5" 2 x 10 supine 5" 2 x 10    Chin tuck with DNF 5" 2 x 10    rows  Org TB x 15 reps Org TB x 20  Green TB x 20 Green TB x 20   TB diagonals   D2 ext org TB x 10 each Org TB 2 x 10 Org TB 2 x 10 Holding green TB, x 20 each direction   Low rows Org TB x 20  Green TB 2 x 10 Green TB x 20   OH LB pull apart      Org x 10   Cervical retraction in sitting  5" x 10       Foam roller on wall roll ups    2 x 10 X 20 X 20 with 5" hold   No money    Org 2 x 10     scap stab    Org TB at wrist 90 degree raise 2 x 10 Org TB at wrist 90 degree raise 2 x 10 Org TB at wrist 90 degree raise 2 x 10    Org TB at wrist at wall, squares x 5 CW/CCW   Ther Ex         Upper trap stretch 30" x 3 30" x 3 each 30" x 3 each  30" x 3 R    Lev scap stretch 30" x 3 30" x 3 each 30" x 3 each      Prone I, T, W   Prone I's 3" hold x 15    T's 3" hold x 15 reps      Upper thoracic extension seated  5" hold, 2 x 10  5" hold 2 x 10     Open book   5" x 10 each side      Cervical SNAGS  5" hold x 10 each       SCM stretch  30" x 2 (sidebend with opposite rotation) 30" x 3 each      UBE      Backwards, 3 minutes, cues for decreased UT activation   Thread the needle    x 10 each X 20 each X 10 each   Ther Activity                           Gait Training                           Modalities                                      Access Code: [email or web address]  URL: [email or web address]  Date: 03/15/2022  Prepared by: Lorenzo Woodruff    Exercises  · Seated Levator Scapulae Stretch - 2 x daily - 7 x weekly - 3 sets - 30 hold  · Seated Upper Trapezius Stretch - 2 x daily - 7 x weekly - 3 sets - 30 hold  · Supine Chin Tuck - 2 x daily - 7 x weekly - 2 sets - 10 reps  · Seated Assisted Cervical Rotation with Towel - 2 x daily - 7 x weekly - 10 reps - 5 hold  · Seated Thoracic Lumbar Extension with Pectoralis Stretch - 2 x daily - 7 x weekly - 2 sets - 10 reps  · Sternocleidomastoid Stretch - 2 x daily - 7 x weekly - 3 sets - 30 hold  · Seated Cervical Retraction - 1 x daily - 7 x weekly - 2 sets - 10 reps - 5 hold

## 2022-04-06 ENCOUNTER — APPOINTMENT (OUTPATIENT)
Dept: PHYSICAL THERAPY | Facility: CLINIC | Age: 37
End: 2022-04-06
Payer: COMMERCIAL

## 2022-04-07 ENCOUNTER — EVALUATION (OUTPATIENT)
Dept: PHYSICAL THERAPY | Facility: CLINIC | Age: 37
End: 2022-04-07
Payer: COMMERCIAL

## 2022-04-07 DIAGNOSIS — M54.2 CERVICAL PAIN: Primary | ICD-10-CM

## 2022-04-07 DIAGNOSIS — G44.209 TENSION HEADACHE: ICD-10-CM

## 2022-04-07 PROCEDURE — 97110 THERAPEUTIC EXERCISES: CPT

## 2022-04-07 PROCEDURE — 97140 MANUAL THERAPY 1/> REGIONS: CPT

## 2022-04-07 PROCEDURE — 97112 NEUROMUSCULAR REEDUCATION: CPT

## 2022-04-07 NOTE — PROGRESS NOTES
Progress Note    Today's date: 2022  Patient name: Stacey Parks  : 1985  MRN: [id number]  Referring provider: Janet Lopez DO  Dx:   Encounter Diagnosis     ICD-10-CM    1  Cervical pain  M54 2    2  Tension headache  G44 209                    Assessment  22: Patient has attended 7 sessions of physical therapy since initial evaluation  Patient has had signficant decrease in pain levels, has increased posture and scapular stability, and DNF strength, as well as had significant improvement in cervical ROM  Continues to have some decreased joint mobility in upper thoracic spine and cervical spine  Plan to follow up in 1 5 weeks, if patient is doing well at this time, will D/C to HEP  Patient is compliant with current HEP  Assessment details: Patient presents to outpatient physical therapy with cervical pain and headache  She presents with impairments of decreased cervical ROM, decreased joint mobility in cervical and thoracic spine, pain, decreased scapular stability and strength, impaired posture, muscle hypertonicity and decreased flexibility, and decreased cervical strength  Patient presents with decreased strength at middle trapezius and lower trapezius, as well as decrease DNF endurance  Patient would benefit from skilled physical therapy to decrease pain, increase functional ROM, improve function, improve QOL and return to prior level of functioning  Impairments: abnormal muscle tone, abnormal or restricted ROM, impaired physical strength, lacks appropriate home exercise program, pain with function and poor posture   Understanding of Dx/Px/POC: good   Prognosis: good    Goals  ST  Decrease subjective pain to 6/10 at worst in 4 weeks to improve function  MET  2  Improve cervical ROM to WNL within 4 weeks MET  3  Improve scapular strength to 4-/5 to improve posture and decrease strain on cervical musculature in 4 weeks PROGRESSING    LT   Decrease subjective pain to 3/10 at worst in 8 weeks to improve function MET  2  Improve scapular strength to 4/5  to improve posture and decrease strain on cervical musculature in 8 weeks  3  Patient will be independent in [facility]  Patient would benefit from: skilled physical therapy  Planned therapy interventions: joint mobilization, manual therapy, massage, neuromuscular re-education, patient education, postural training, strengthening, stretching, flexibility, therapeutic activities, therapeutic exercise and home exercise program  Frequency: 2x week  Duration in visits: 8  Plan of Care beginning date: 3/8/2022  Plan of Care expiration date: 5/3/2022  Treatment plan discussed with: patient        Subjective Evaluation    History of Present Illness  4/7/22: Patient reports she is doing better with decreased pain  She states she had some pain yesterday morning because she didn't sleep well, but felt better after sleeping after work  She states she has some discomfort  At worst 3-4/10  She states that she doesn't really feel much discomfort, only when she doesn't sleep well or has increased fatigue or stress  Perceived improvement 90%  No headaches  Mechanism of injury: Patient reports that she feels pain in the back of the neck and back of the head, sometimes causing headaches  Patient reports it started about 2 months ago  She states that she had she had this pain awhile ago, went away and has returned and has come and gone for a few years  Headaches occurring have been getting better, but the pain in the neck continues  She states that if she needs to do a lot of things and hurry, more stress makes is worse  "doing too many things at a time" makes the pain worse  She states the doctor stated that her seasonal allergies may be contributing to the pain, has been taking allergy medication (Claritin and nose spray), has helped the headaches  No HA in the past 2 weeks  Sometimes feels terrible, and sometimes more uncomfortable   Increased pain if she does not sleep well  Works as a   Pain  Current pain ratin  At best pain ratin  At worst pain ratin  Relieving factors: relaxation and rest  Aggravating factors: sitting  At worst 3-4/10, 2/10 average        Objective     Concurrent Complaints  Positive for headaches  Negative for night pain, disturbed sleep, dizziness, faints and nausea/motion sickness    Additional Special Questions  No numbness or tingling or pain in UE's    Palpation   Left   Hypertonic in the scalenes, suboccipitals and upper trapezius  Tenderness of the scalenes, suboccipitals and upper trapezius  Right   Hypertonic in the scalenes, suboccipitals and upper trapezius  Tenderness of the scalenes, suboccipitals and upper trapezius  Tenderness   Cervical Spine   Tenderness in the facet joint and spinous process  Active Range of Motion     Additional Active Range of Motion Details  Flexion WNL  Extension WNL  R SB: 20 degrees pain 42 degrees  L SB: 18 degrees pain 45 degrees  R rotation: 60 degrees WNL  L rotation: 55 degrees, pain WNL    Joint Play     Hypomobile: C3, C4, C5, C6, C7, T1, T2, T3, T4, T5 and T6   22: Hypomobile and pain at R C4-5 and T4    Pain: C3, C4, C5, C6, C7, T1, T2, T3, T4, T5 and T6     Strength/Myotome Testing     Left Shoulder     Isolated Muscles   Lower trapezius: 3- 4/7: 3+/5  Middle trapezius: 3+ 4/7: 4-/5    Right Shoulder     Isolated Muscles   Lower trapezius: 3- 4/7: 3+/5  Middle trapezius: 3+ 4/7: 4-/5    Tests   Cervical   Positive neck flexor muscle endurance test   Negative cervical distraction  Additional Tests Details  DNF endurance 7 seconds, significant accessory muscle activation, 25 seconds  Neuro Exam:     Headaches   Patient reports headaches: Yes  Posture assessment: Patient with FHP, rounded shoulders, excessive upper cervical extension, decreased thoracic kyphosis, increased kyphosis at upper thoracic spine           Precautions: none      Manuals 3/8 3/15 3/16 3/25 3/30 4/1 4/7    cervical distraction, SOR, jt mobs lat glide C4-C6 bilateral Cervical distraction, SOR, jt mobs lat glide R C4-C6 grade II    Gentle IASTM to bilateral UT in sitting Cervical distraction, jt mobs lat glide R C4-C6 grade II    Gentle IASTM to bilateral UT and lev scap in sitting Cervical distraction, jt mobs R C4-C6 grade III    Gentle IASTM to R UT and lev scap in sitting IASTM to R UE and lev scap in sitting    jt mobs L T3-T6, L rhomboids    MET for elevated R 1st rib IASTM to R UT and lev scap in sitting  Joint mobs T4-T5 CPA grade III,  R sideglide C4-C6 grade II/III, L C5 grade III Jt mobs T 3-T5 UPA and CPA grade II/III in prone    IASTM to bilateral upper traps and lev scap                                 Neuro Re-Ed          quadruped push up with plus      In quadruped, push plus with cervical retraction 2 x 10 Quadruped push plus with cervical retraction 2 x 10   Chin tuck with retraction 5" x 10   Seated 5" 2 x 10 supine 5" 2 x 10    Chin tuck with DNF 5" 2 x 10  DNF: 10" x 10   rows  Org TB x 15 reps Org TB x 20  Green TB x 20 Green TB x 20    TB diagonals   D2 ext org TB x 10 each Org TB 2 x 10 Org TB 2 x 10 Holding green TB, x 20 each direction    Low rows   Org TB x 20  Green TB 2 x 10 Green TB x 20    OH LB pull apart      Org x 10    Cervical retraction in sitting  5" x 10        Foam roller on wall roll ups    2 x 10 X 20 X 20 with 5" hold 5" hold x 20   No money    Org 2 x 10      scap stab    Org TB at wrist 90 degree raise 2 x 10 Org TB at wrist 90 degree raise 2 x 10 Org TB at wrist 90 degree raise 2 x 10    Org TB at wrist at wall, squares x 5 CW/CCW Org TB at wrist 90 degree raise 2 x 10    Org TB at wrist at wall, squares x 5 CW/CCW   Ther Ex          Upper trap stretch 30" x 3 30" x 3 each 30" x 3 each  30" x 3 R  30" x 2   Lev scap stretch 30" x 3 30" x 3 each 30" x 3 each       Prone I, T, W   Prone I's 3" hold x 15    T's 3" hold x 15 reps       Upper thoracic extension seated  5" hold, 2 x 10  5" hold 2 x 10      Open book   5" x 10 each side       Cervical SNAGS  5" hold x 10 each        SCM stretch  30" x 2 (sidebend with opposite rotation) 30" x 3 each       UBE      Backwards, 3 minutes, cues for decreased UT activation Backwards only, 4 minutes   Thread the needle    x 10 each X 20 each X 10 each    Ther Activity                              Gait Training                              Modalities                 reveiwed HEP, POC, strengthening and posture, possibly using gua sha tool for self [facility]                          Access Code: [email or web address]  URL: [email or web address]  Date: 03/15/2022  Prepared by: Mukund Riddle    Exercises  · Seated Levator Scapulae Stretch - 2 x daily - 7 x weekly - 3 sets - 30 hold  · Seated Upper Trapezius Stretch - 2 x daily - 7 x weekly - 3 sets - 30 hold  · Supine Chin Tuck - 2 x daily - 7 x weekly - 2 sets - 10 reps  · Seated Assisted Cervical Rotation with Towel - 2 x daily - 7 x weekly - 10 reps - 5 hold  · Seated Thoracic Lumbar Extension with Pectoralis Stretch - 2 x daily - 7 x weekly - 2 sets - 10 reps  · Sternocleidomastoid Stretch - 2 x daily - 7 x weekly - 3 sets - 30 hold  · Seated Cervical Retraction - 1 x daily - 7 x weekly - 2 sets - 10 reps - 5 hold

## 2022-05-20 NOTE — PROGRESS NOTES
Patient was placed on 30 day hold, did schedule an appointment at 30 day gila but cancelled appointment and has not returned phone call to schedule another appointment  Discharge at this time

## 2023-01-18 ENCOUNTER — HOSPITAL ENCOUNTER (INPATIENT)
Facility: HOSPITAL | Age: 38
LOS: 3 days | Discharge: HOME | End: 2023-01-23
Attending: EMERGENCY MEDICINE | Admitting: SURGERY

## 2023-01-18 ENCOUNTER — APPOINTMENT (EMERGENCY)
Dept: ULTRASOUND IMAGING | Facility: HOSPITAL | Age: 38
End: 2023-01-18

## 2023-01-18 DIAGNOSIS — K80.20 CHOLELITHIASIS: Primary | ICD-10-CM

## 2023-01-18 DIAGNOSIS — K80.50 CHOLEDOCHOLITHIASIS: ICD-10-CM

## 2023-01-18 DIAGNOSIS — R74.01 TRANSAMINITIS: ICD-10-CM

## 2023-01-18 DIAGNOSIS — E80.6 HYPERBILIRUBINEMIA: ICD-10-CM

## 2023-01-18 LAB
ALBUMIN SERPL BCP-MCNC: 4.4 G/DL (ref 3.5–5)
ALP SERPL-CCNC: 148 U/L (ref 34–104)
ALT SERPL W P-5'-P-CCNC: 632 U/L (ref 7–52)
ANION GAP SERPL CALCULATED.3IONS-SCNC: 8 MMOL/L (ref 4–13)
AST SERPL W P-5'-P-CCNC: 554 U/L (ref 13–39)
BACTERIA UR QL AUTO: NORMAL /HPF
BASOPHILS # BLD AUTO: 0.02 THOUSANDS/ÂΜL (ref 0–0.1)
BASOPHILS NFR BLD AUTO: 0 % (ref 0–1)
BILIRUB SERPL-MCNC: 2.48 MG/DL (ref 0.2–1)
BILIRUB UR QL STRIP: ABNORMAL
BUN SERPL-MCNC: 8 MG/DL (ref 5–25)
CALCIUM SERPL-MCNC: 9.8 MG/DL (ref 8.4–10.2)
CHLORIDE SERPL-SCNC: 104 MMOL/L (ref 96–108)
CLARITY UR: ABNORMAL
CO2 SERPL-SCNC: 25 MMOL/L (ref 21–32)
COLOR UR: YELLOW
CREAT SERPL-MCNC: 0.6 MG/DL (ref 0.6–1.3)
EOSINOPHIL # BLD AUTO: 0.12 THOUSAND/ÂΜL (ref 0–0.61)
EOSINOPHIL NFR BLD AUTO: 3 % (ref 0–6)
ERYTHROCYTE [DISTWIDTH] IN BLOOD BY AUTOMATED COUNT: 15.5 % (ref 11.6–15.1)
EXT PREGNANCY TEST URINE: NEGATIVE
EXT. CONTROL: NORMAL
FLUAV RNA RESP QL NAA+PROBE: NEGATIVE
FLUBV RNA RESP QL NAA+PROBE: NEGATIVE
GFR SERPL CREATININE-BSD FRML MDRD: 116 ML/MIN/1.73SQ M
GLUCOSE SERPL-MCNC: 95 MG/DL (ref 65–140)
GLUCOSE UR STRIP-MCNC: NEGATIVE MG/DL
HCT VFR BLD AUTO: 37 % (ref 34.8–46.1)
HGB BLD-MCNC: 11.8 G/DL (ref 11.5–15.4)
HGB UR QL STRIP.AUTO: NEGATIVE
IMM GRANULOCYTES # BLD AUTO: 0.01 THOUSAND/UL (ref 0–0.2)
IMM GRANULOCYTES NFR BLD AUTO: 0 % (ref 0–2)
KETONES UR STRIP-MCNC: NEGATIVE MG/DL
LEUKOCYTE ESTERASE UR QL STRIP: NEGATIVE
LIPASE SERPL-CCNC: 30 U/L (ref 11–82)
LYMPHOCYTES # BLD AUTO: 1.28 THOUSANDS/ÂΜL (ref 0.6–4.47)
LYMPHOCYTES NFR BLD AUTO: 28 % (ref 14–44)
MCH RBC QN AUTO: 26.6 PG (ref 26.8–34.3)
MCHC RBC AUTO-ENTMCNC: 31.9 G/DL (ref 31.4–37.4)
MCV RBC AUTO: 83 FL (ref 82–98)
MONOCYTES # BLD AUTO: 0.4 THOUSAND/ÂΜL (ref 0.17–1.22)
MONOCYTES NFR BLD AUTO: 9 % (ref 4–12)
NEUTROPHILS # BLD AUTO: 2.82 THOUSANDS/ÂΜL (ref 1.85–7.62)
NEUTS SEG NFR BLD AUTO: 60 % (ref 43–75)
NITRITE UR QL STRIP: NEGATIVE
NON-SQ EPI CELLS URNS QL MICRO: NORMAL /HPF
NRBC BLD AUTO-RTO: 0 /100 WBCS
PH UR STRIP.AUTO: 8.5 [PH]
PLATELET # BLD AUTO: 219 THOUSANDS/UL (ref 149–390)
PMV BLD AUTO: 11 FL (ref 8.9–12.7)
POTASSIUM SERPL-SCNC: 4 MMOL/L (ref 3.5–5.3)
PROT SERPL-MCNC: 7.9 G/DL (ref 6.4–8.4)
PROT UR STRIP-MCNC: ABNORMAL MG/DL
RBC # BLD AUTO: 4.44 MILLION/UL (ref 3.81–5.12)
RBC #/AREA URNS AUTO: NORMAL /HPF
RSV RNA RESP QL NAA+PROBE: NEGATIVE
SARS-COV-2 RNA RESP QL NAA+PROBE: NEGATIVE
SODIUM SERPL-SCNC: 137 MMOL/L (ref 135–147)
SP GR UR STRIP.AUTO: 1.02 (ref 1–1.03)
UROBILINOGEN UR STRIP-ACNC: <2 MG/DL
WBC # BLD AUTO: 4.65 THOUSAND/UL (ref 4.31–10.16)
WBC #/AREA URNS AUTO: NORMAL /HPF

## 2023-01-18 RX ORDER — HYDROMORPHONE HCL/PF 1 MG/ML
0.5 SYRINGE (ML) INJECTION EVERY 4 HOURS PRN
Status: DISCONTINUED | OUTPATIENT
Start: 2023-01-18 | End: 2023-01-20

## 2023-01-18 RX ORDER — ONDANSETRON 2 MG/ML
4 INJECTION INTRAMUSCULAR; INTRAVENOUS EVERY 6 HOURS PRN
Status: DISCONTINUED | OUTPATIENT
Start: 2023-01-18 | End: 2023-01-23 | Stop reason: HOSPADM

## 2023-01-18 RX ORDER — OXYCODONE HYDROCHLORIDE 5 MG/1
5 TABLET ORAL EVERY 4 HOURS PRN
Status: DISCONTINUED | OUTPATIENT
Start: 2023-01-18 | End: 2023-01-23 | Stop reason: HOSPADM

## 2023-01-18 RX ORDER — METRONIDAZOLE 500 MG/100ML
500 INJECTION, SOLUTION INTRAVENOUS EVERY 8 HOURS
Status: DISCONTINUED | OUTPATIENT
Start: 2023-01-18 | End: 2023-01-23

## 2023-01-18 RX ORDER — HEPARIN SODIUM 5000 [USP'U]/ML
5000 INJECTION, SOLUTION INTRAVENOUS; SUBCUTANEOUS EVERY 8 HOURS SCHEDULED
Status: DISCONTINUED | OUTPATIENT
Start: 2023-01-18 | End: 2023-01-23 | Stop reason: HOSPADM

## 2023-01-18 RX ORDER — OXYCODONE HYDROCHLORIDE 10 MG/1
10 TABLET ORAL EVERY 4 HOURS PRN
Status: DISCONTINUED | OUTPATIENT
Start: 2023-01-18 | End: 2023-01-20

## 2023-01-18 RX ORDER — SODIUM CHLORIDE, SODIUM LACTATE, POTASSIUM CHLORIDE, CALCIUM CHLORIDE 600; 310; 30; 20 MG/100ML; MG/100ML; MG/100ML; MG/100ML
125 INJECTION, SOLUTION INTRAVENOUS CONTINUOUS
Status: DISCONTINUED | OUTPATIENT
Start: 2023-01-18 | End: 2023-01-20

## 2023-01-18 RX ORDER — ONDANSETRON 2 MG/ML
4 INJECTION INTRAMUSCULAR; INTRAVENOUS ONCE
Status: COMPLETED | OUTPATIENT
Start: 2023-01-18 | End: 2023-01-18

## 2023-01-18 RX ORDER — CEFAZOLIN SODIUM 1 G/50ML
1000 SOLUTION INTRAVENOUS EVERY 8 HOURS
Status: DISCONTINUED | OUTPATIENT
Start: 2023-01-18 | End: 2023-01-23

## 2023-01-18 RX ORDER — HYDROMORPHONE HCL/PF 1 MG/ML
0.5 SYRINGE (ML) INJECTION ONCE
Status: COMPLETED | OUTPATIENT
Start: 2023-01-18 | End: 2023-01-18

## 2023-01-18 RX ADMIN — CEFAZOLIN SODIUM 1000 MG: 1 SOLUTION INTRAVENOUS at 21:20

## 2023-01-18 RX ADMIN — ONDANSETRON 4 MG: 2 INJECTION INTRAMUSCULAR; INTRAVENOUS at 19:25

## 2023-01-18 RX ADMIN — HYDROMORPHONE HYDROCHLORIDE 0.5 MG: 1 INJECTION, SOLUTION INTRAMUSCULAR; INTRAVENOUS; SUBCUTANEOUS at 19:28

## 2023-01-18 RX ADMIN — METRONIDAZOLE 500 MG: 5 INJECTION, SOLUTION INTRAVENOUS at 21:49

## 2023-01-18 RX ADMIN — SODIUM CHLORIDE 1000 ML: 0.9 INJECTION, SOLUTION INTRAVENOUS at 19:25

## 2023-01-18 RX ADMIN — SODIUM CHLORIDE, SODIUM LACTATE, POTASSIUM CHLORIDE, AND CALCIUM CHLORIDE 125 ML/HR: .6; .31; .03; .02 INJECTION, SOLUTION INTRAVENOUS at 22:59

## 2023-01-18 RX ADMIN — SODIUM CHLORIDE, SODIUM LACTATE, POTASSIUM CHLORIDE, AND CALCIUM CHLORIDE 125 ML/HR: .6; .31; .03; .02 INJECTION, SOLUTION INTRAVENOUS at 21:15

## 2023-01-19 ENCOUNTER — ANESTHESIA EVENT (OUTPATIENT)
Dept: PERIOP | Facility: HOSPITAL | Age: 38
End: 2023-01-19

## 2023-01-19 ENCOUNTER — APPOINTMENT (OUTPATIENT)
Dept: MRI IMAGING | Facility: HOSPITAL | Age: 38
End: 2023-01-19

## 2023-01-19 ENCOUNTER — ANESTHESIA (OUTPATIENT)
Dept: PERIOP | Facility: HOSPITAL | Age: 38
End: 2023-01-19

## 2023-01-19 PROBLEM — K80.20 SYMPTOMATIC CHOLELITHIASIS: Status: ACTIVE | Noted: 2023-01-19

## 2023-01-19 LAB
ALBUMIN SERPL BCP-MCNC: 3.6 G/DL (ref 3.5–5)
ALP SERPL-CCNC: 128 U/L (ref 34–104)
ALT SERPL W P-5'-P-CCNC: 400 U/L (ref 7–52)
ANION GAP SERPL CALCULATED.3IONS-SCNC: 9 MMOL/L (ref 4–13)
AST SERPL W P-5'-P-CCNC: 251 U/L (ref 13–39)
BILIRUB DIRECT SERPL-MCNC: 1.89 MG/DL (ref 0–0.2)
BILIRUB SERPL-MCNC: 2.86 MG/DL (ref 0.2–1)
BUN SERPL-MCNC: 7 MG/DL (ref 5–25)
CALCIUM SERPL-MCNC: 8.5 MG/DL (ref 8.4–10.2)
CHLORIDE SERPL-SCNC: 107 MMOL/L (ref 96–108)
CO2 SERPL-SCNC: 21 MMOL/L (ref 21–32)
CREAT SERPL-MCNC: 0.51 MG/DL (ref 0.6–1.3)
ERYTHROCYTE [DISTWIDTH] IN BLOOD BY AUTOMATED COUNT: 15.5 % (ref 11.6–15.1)
GFR SERPL CREATININE-BSD FRML MDRD: 123 ML/MIN/1.73SQ M
GLUCOSE SERPL-MCNC: 94 MG/DL (ref 65–140)
HCT VFR BLD AUTO: 31.7 % (ref 34.8–46.1)
HGB BLD-MCNC: 10.1 G/DL (ref 11.5–15.4)
MAGNESIUM SERPL-MCNC: 1.9 MG/DL (ref 1.9–2.7)
MCH RBC QN AUTO: 26.3 PG (ref 26.8–34.3)
MCHC RBC AUTO-ENTMCNC: 31.9 G/DL (ref 31.4–37.4)
MCV RBC AUTO: 83 FL (ref 82–98)
PHOSPHATE SERPL-MCNC: 2.7 MG/DL (ref 2.7–4.5)
PLATELET # BLD AUTO: 188 THOUSANDS/UL (ref 149–390)
PMV BLD AUTO: 11.2 FL (ref 8.9–12.7)
POTASSIUM SERPL-SCNC: 3.6 MMOL/L (ref 3.5–5.3)
PROT SERPL-MCNC: 6.3 G/DL (ref 6.4–8.4)
RBC # BLD AUTO: 3.84 MILLION/UL (ref 3.81–5.12)
SODIUM SERPL-SCNC: 137 MMOL/L (ref 135–147)
WBC # BLD AUTO: 5.31 THOUSAND/UL (ref 4.31–10.16)

## 2023-01-19 RX ORDER — SIMETHICONE 20 MG/.3ML
40 EMULSION ORAL ONCE
Status: COMPLETED | OUTPATIENT
Start: 2023-01-19 | End: 2023-01-19

## 2023-01-19 RX ORDER — POTASSIUM CHLORIDE 20 MEQ/1
40 TABLET, EXTENDED RELEASE ORAL ONCE
Status: DISCONTINUED | OUTPATIENT
Start: 2023-01-19 | End: 2023-01-21

## 2023-01-19 RX ADMIN — ONDANSETRON 4 MG: 2 INJECTION INTRAMUSCULAR; INTRAVENOUS at 09:46

## 2023-01-19 RX ADMIN — OXYCODONE HYDROCHLORIDE 5 MG: 5 TABLET ORAL at 20:49

## 2023-01-19 RX ADMIN — METRONIDAZOLE 500 MG: 5 INJECTION, SOLUTION INTRAVENOUS at 22:01

## 2023-01-19 RX ADMIN — CEFAZOLIN SODIUM 1000 MG: 1 SOLUTION INTRAVENOUS at 21:10

## 2023-01-19 RX ADMIN — CEFAZOLIN SODIUM 1000 MG: 1 SOLUTION INTRAVENOUS at 13:36

## 2023-01-19 RX ADMIN — METRONIDAZOLE 500 MG: 5 INJECTION, SOLUTION INTRAVENOUS at 05:45

## 2023-01-19 RX ADMIN — OXYCODONE HYDROCHLORIDE 10 MG: 10 TABLET ORAL at 07:38

## 2023-01-19 RX ADMIN — CEFAZOLIN SODIUM 1000 MG: 1 SOLUTION INTRAVENOUS at 04:38

## 2023-01-19 RX ADMIN — ONDANSETRON 4 MG: 2 INJECTION INTRAMUSCULAR; INTRAVENOUS at 16:58

## 2023-01-19 RX ADMIN — METRONIDAZOLE 500 MG: 5 INJECTION, SOLUTION INTRAVENOUS at 14:18

## 2023-01-19 RX ADMIN — SODIUM CHLORIDE, SODIUM LACTATE, POTASSIUM CHLORIDE, AND CALCIUM CHLORIDE 125 ML/HR: .6; .31; .03; .02 INJECTION, SOLUTION INTRAVENOUS at 09:46

## 2023-01-19 RX ADMIN — SODIUM CHLORIDE, SODIUM LACTATE, POTASSIUM CHLORIDE, AND CALCIUM CHLORIDE 125 ML/HR: .6; .31; .03; .02 INJECTION, SOLUTION INTRAVENOUS at 16:56

## 2023-01-19 RX ADMIN — SIMETHICONE 40 MG: 20 EMULSION ORAL at 16:48

## 2023-01-19 NOTE — H&P
H&P Exam - General Surgery   Hola Sousa 40 y o  female MRN: [id number]  Unit/Bed#: ED-22 Encounter: [id number]    Assessment/Plan     Assessment:  37yoF p/w epigastric/RUQ abdominal pain x1 day, associated nausea/emesis, cholelithiasis on ultrasound, associated elevated total bilirubin    1/18 RUQ US: Cholelithiasis w/o evidence of acute cholecystitis, no wall thickening or pericholecystic fluid, CBD 4 mm, no choledocholithiasis, no intrahepatic biliary dilation    Vital stable, afebrile  WBC 4 65  Hemoglobin 11 8  Creatinine 0 6  T bili 2 48  AST//632, alk phos 148    Plan:  -Admit to the general surgery service  -will order MRCP to further investigate possible choledocholithiasis, will follow-up morning CMP tomorrow  -GI consult for possible choledocholithiasis, may need ERCP depending on MRCP findings prior to ultimate cholecystectomy  -We will tentatively book for laparoscopic cholecystectomy with IOC for tomorrow 1/19  -Ancef/Flagyl  -Can have CLD until midnight, then n p o   [email or web address]  -Pain control  -Encourage ambulation  -SQH    History of Present Illness   HPI:  Hola Sousa is a 40 y o  female w insignificant PMH presenting [facility] reporting 1 day of abdominal pain localized to the epigastric region and RUQ, associated nausea and emesis, found to have cholelithiasis on imaging without acute cholecystitis, also noted to have elevated total bilirubin  General surgery contacted for further work-up and management  Patient reports onset of abdominal pain mainly localized to the epigastric region yesterday afternoon, she denies any inciting factors or traumatic events, states she had some noodles for lunch earlier in the day but this pain occurred well after she had eaten her meal, describes it as sharp in character  She reports associated nausea with multiple episodes of NB/NB emesis    She was able to eat some small amount of mashed potatoes for dinner last night but was not able to eat that much compared to her normal   She again woke up this morning around 4:30 AM with the same pain, associated nausea and episodes of NB/NB emesis  She has had decreased p o  intake today  Denies any changes in her bowel movements  She denies any recent fevers or chills, denies any dizziness or lightheadedness  She denies similar symptoms in the past although she said her primary care doctor did tell her she thinks last year that she has gallstones but if she remains asymptomatic that no intervention is necessary  She reports this is being her first episode of such pain  She reports a history of  x3, her last  was approximately 13 years ago  Review of Systems   Constitutional: Positive for appetite change  Negative for activity change, chills and fever  Respiratory: Negative for shortness of breath  Cardiovascular: Negative for chest pain  Gastrointestinal: Positive for abdominal pain, nausea and vomiting  Negative for abdominal distention, constipation and diarrhea  Genitourinary: Negative for difficulty urinating  Musculoskeletal: Negative for back pain and neck pain  Neurological: Negative for dizziness, light-headedness and headaches  Psychiatric/Behavioral: Negative for agitation and confusion  All other systems reviewed and are negative  Historical Information   History reviewed  No pertinent past medical history    Past Surgical History:   Procedure Laterality Date   •  SECTION       Social History   Social History     Substance and Sexual Activity   Alcohol Use Not Currently    Comment: occasionally     Social History     Substance and Sexual Activity   Drug Use Not Currently     Social History     Tobacco Use   Smoking Status Never   Smokeless Tobacco Never     E-Cigarette/Vaping   • E-Cigarette Use Never User      E-Cigarette/Vaping Substances     Family History: non-contributory    Meds/Allergies   all medications and allergies reviewed  No Known Allergies    Objective   First Vitals:   Blood Pressure: 120/75 (01/18/23 1544)  Pulse: 85 (01/18/23 1544)  Temperature: 98 4 °F (36 9 °C) (01/18/23 1544)  Temp Source: Oral (01/18/23 1544)  Respirations: 18 (01/18/23 1544)  SpO2: 100 % (01/18/23 1544)    Current Vitals:   Blood Pressure: 112/56 (01/18/23 2015)  Pulse: 79 (01/18/23 2015)  Temperature: 99 °F (37 2 °C) (01/18/23 1930)  Temp Source: Oral (01/18/23 1930)  Respirations: 16 (01/18/23 2015)  SpO2: 100 % (01/18/23 2015)    No intake or output data in the 24 hours ending 01/18/23 2101    Invasive Devices     Peripheral Intravenous Line  Duration           Peripheral IV 01/18/23 Right Antecubital <1 day                Physical Exam  Vitals reviewed  Constitutional:       General: She is not in acute distress  Appearance: Normal appearance  She is not ill-appearing or toxic-appearing  HENT:      Head: Normocephalic and atraumatic  Mouth/Throat:      Mouth: Mucous membranes are moist    Cardiovascular:      Rate and Rhythm: Normal rate and regular rhythm  Pulses: Normal pulses  Heart sounds: Normal heart sounds  Pulmonary:      Effort: Pulmonary effort is normal  No respiratory distress  Breath sounds: Normal breath sounds  Abdominal:      General: There is no distension  Palpations: Abdomen is soft  Tenderness: There is abdominal tenderness  There is no guarding or rebound  Comments: Abdomen is soft, nondistended, tender to palpation in the epigastric region as well as the RUQ, Traore sign negative, no guarding or rigidity, no signs of peritonitis   Musculoskeletal:         General: Normal range of motion  Cervical back: Normal range of motion  Skin:     General: Skin is warm  Capillary Refill: Capillary refill takes less than 2 seconds  Neurological:      Mental Status: She is alert and oriented to person, place, and time  Psychiatric:         Mood and Affect: Mood normal          Lab Results:    I have personally reviewed pertinent lab results  , CBC:   Lab Results   Component Value Date    WBC 4 65 01/18/2023    HGB 11 8 01/18/2023    HCT 37 0 01/18/2023    MCV 83 01/18/2023     01/18/2023    MCH 26 6 (L) 01/18/2023    MCHC 31 9 01/18/2023    RDW 15 5 (H) 01/18/2023    MPV 11 0 01/18/2023    NRBC 0 01/18/2023   , CMP:   Lab Results   Component Value Date    SODIUM 137 01/18/2023    K 4 0 01/18/2023     01/18/2023    CO2 25 01/18/2023    BUN 8 01/18/2023    CREATININE 0 60 01/18/2023    CALCIUM 9 8 01/18/2023     (H) 01/18/2023     (H) 01/18/2023    ALKPHOS 148 (H) 01/18/2023    EGFR 116 01/18/2023     Imaging: I have personally reviewed pertinent reports  EKG, Pathology, and Other Studies: I have personally reviewed pertinent reports        Code Status: Level 1 - Full Code  Advance Directive and Living Will:      Power of :    POLST:

## 2023-01-19 NOTE — INCIDENTAL FINDINGS
The following findings require follow up:  Radiographic finding   Finding: Tiny scattered pancreatic cysts not definitely communicating with the nondilated pancreatic duct  These are nonspecific an follow-up is recommended   Follow up required: Recommend next followup in 1 year   Preferred imaging modality: abdomen MRI and MRCP with and without IV contrast, or triple phase abdomen CT with IV contrast, or abdomen MRI and MRCP without   IV contrast    Follow up should be done within 1 year    Please notify the following clinician to assist with the follow up:   Dr Milad Mercedes MD (PCP)

## 2023-01-19 NOTE — ED PROVIDER NOTES
History  Chief Complaint   Patient presents with   • Abdominal Pain     Patient c/o epigastric abdominal pain, with N/V      27-year-old female presents the emergency department for evaluation of 2-day history of right upper quadrant stated with nausea and vomiting  Patient reports the pain started last night after eating a heavy meal   She denies fevers or chills  She has had several episodes of nonbloody, nonbilious vomiting  Pain is localized to the right upper quadrant and eats towards her back  She has no previous abdominal history other than a  section  Last menstrual cycle was 2022  No history of peptic ulcer or gastritis, pancreatitis  History provided by:  Patient and medical records   used: No    Abdominal Pain  Pain location:  RUQ  Pain quality: sharp    Pain radiates to:  Back  Pain severity:  Severe  Onset quality:  Gradual  Duration:  2 days  Timing:  Constant  Progression:  Unchanged  Chronicity:  New  Context: eating (pain after eating last pm)    Context: not sick contacts and not suspicious food intake    Relieved by:  Nothing  Worsened by:  Nothing  Ineffective treatments:  None tried  Associated symptoms: nausea    Associated symptoms: no belching, no chest pain, no chills, no diarrhea, no fever and no hematemesis    Risk factors: not pregnant and no recent hospitalization        Prior to Admission Medications   Prescriptions Last Dose Informant Patient Reported? Taking?   naproxen (NAPROSYN) 500 mg tablet   Yes No   Sig: Take 500 mg by mouth 2 (two) times a day with meals   oxymetazoline (AFRIN) 0 05 % nasal spray Not Taking  Yes No   Si sprays by Each Nare route 2 (two) times a day   Patient not taking: Reported on 2023      Facility-Administered Medications: None       History reviewed  No pertinent past medical history      Past Surgical History:   Procedure Laterality Date   •  SECTION         Family History   Problem Relation Age of Onset   • Hypertension Mother    • Thyroid disease Mother    • Hyperlipidemia Mother    • Diabetes Father    • Hypertension Father    • Colon cancer Paternal Grandfather    • Colon cancer Paternal Uncle    • No Known Problems Brother    • Cancer Paternal Grandmother      I have reviewed and agree with the history as documented  E-Cigarette/Vaping   • E-Cigarette Use Never User      E-Cigarette/Vaping Substances     Social History     Tobacco Use   • Smoking status: Never   • Smokeless tobacco: Never   Vaping Use   • Vaping Use: Never used   Substance Use Topics   • Alcohol use: Not Currently     Comment: occasionally   • Drug use: Not Currently       Review of Systems   Constitutional: Negative for chills and fever  Cardiovascular: Negative for chest pain  Gastrointestinal: Positive for abdominal pain and nausea  Negative for diarrhea and hematemesis  All other systems reviewed and are negative  Physical Exam  Physical Exam  Vitals and nursing note reviewed  Constitutional:       General: She is in acute distress  Appearance: She is well-developed  She is not ill-appearing  HENT:      Head: Normocephalic  Nose: Nose normal       Mouth/Throat:      Pharynx: No oropharyngeal exudate  Eyes:      General: No scleral icterus  Conjunctiva/sclera: Conjunctivae normal       Pupils: Pupils are equal, round, and reactive to light  Cardiovascular:      Rate and Rhythm: Normal rate and regular rhythm  Heart sounds: Normal heart sounds  No murmur heard  Pulmonary:      Effort: Pulmonary effort is normal       Breath sounds: Normal breath sounds  Chest:      Chest wall: No tenderness  Abdominal:      General: Bowel sounds are normal  There is no distension  Palpations: Abdomen is soft  Tenderness: There is abdominal tenderness in the right upper quadrant and epigastric area  There is no right CVA tenderness, guarding or rebound  Hernia: No hernia is present  Musculoskeletal:         General: No tenderness or deformity  Normal range of motion  Cervical back: Normal range of motion and neck supple  Lymphadenopathy:      Cervical: No cervical adenopathy  Skin:     General: Skin is warm and dry  Findings: No rash  Neurological:      Mental Status: She is alert and oriented to person, place, and time  Cranial Nerves: No cranial nerve deficit  Sensory: No sensory deficit  Motor: No abnormal muscle tone  Coordination: Coordination normal       Gait: Gait normal       Deep Tendon Reflexes: Reflexes are normal and symmetric  Psychiatric:         Behavior: Behavior normal          Thought Content:  Thought content normal          Judgment: Judgment normal          Vital Signs  ED Triage Vitals   Temperature Pulse Respirations Blood Pressure SpO2   01/18/23 1544 01/18/23 1544 01/18/23 1544 01/18/23 1544 01/18/23 1544   98 4 °F (36 9 °C) 85 18 120/75 100 %      Temp Source Heart Rate Source Patient Position - Orthostatic VS BP Location FiO2 (%)   01/18/23 1544 01/18/23 1544 01/18/23 1544 01/18/23 1544 --   Oral Monitor Sitting Right arm       Pain Score       01/18/23 2115       4           Vitals:    01/20/23 1449 01/20/23 2221 01/21/23 0821 01/21/23 1009   BP: 124/73 117/69 118/62 124/59   Pulse: 70  87 66   Patient Position - Orthostatic VS:             Visual Acuity      ED Medications  Medications   heparin (porcine) subcutaneous injection 5,000 Units (5,000 Units Subcutaneous Given 1/21/23 0828)   ondansetron (ZOFRAN) injection 4 mg (4 mg Intravenous Given 1/21/23 1001)   oxyCODONE (ROXICODONE) IR tablet 5 mg (5 mg Oral Given 1/20/23 1422)   ceFAZolin (ANCEF) IVPB (premix in dextrose) 1,000 mg 50 mL (1,000 mg Intravenous New Bag 1/21/23 0631)   metroNIDAZOLE (FLAGYL) IVPB (premix) 500 mg 100 mL (500 mg Intravenous New Bag 1/21/23 0432)   lactated ringers infusion (0 mL/hr Intravenous Stopped 1/21/23 1000)   HYDROmorphone (DILAUDID) injection 0 5 mg (0 5 mg Intravenous Given 1/21/23 0619)   simethicone (MYLICON) chewable tablet 80 mg (has no administration in time range)   sodium chloride 0 9 % bolus 1,000 mL (0 mL Intravenous Stopped 1/18/23 2110)   HYDROmorphone (DILAUDID) injection 0 5 mg (0 5 mg Intravenous Given 1/18/23 1928)   ondansetron (ZOFRAN) injection 4 mg (4 mg Intravenous Given 1/18/23 1925)   simethicone (MYLICON) oral suspension 40 mg (40 mg Oral Given 1/19/23 1648)   potassium chloride (K-DUR,KLOR-CON) CR tablet 40 mEq (40 mEq Oral Given 1/21/23 0828)   indomethacin (INDOCIN) rectal suppository (100 mg Rectal Given 1/21/23 1119)   iohexol (OMNIPAQUE) 240 MG/ML solution (5 mL Intravenous Given 1/21/23 1140)       Diagnostic Studies  Results Reviewed     Procedure Component Value Units Date/Time    Comprehensive metabolic panel [[id number]]  (Abnormal) Collected: 01/19/23 0502    Lab Status: Final result Specimen: Blood from Arm, Left Updated: 01/19/23 0555     Sodium 137 mmol/L      Potassium 3 6 mmol/L      Chloride 107 mmol/L      CO2 21 mmol/L      ANION GAP 9 mmol/L      BUN 7 mg/dL      Creatinine 0 51 mg/dL      Glucose 94 mg/dL      Calcium 8 5 mg/dL       U/L       U/L      Alkaline Phosphatase 128 U/L      Total Protein 6 3 g/dL      Albumin 3 6 g/dL      Total Bilirubin 2 86 mg/dL      eGFR 123 ml/min/1 73sq m     Narrative:      [place] guidelines for Chronic Kidney Disease (CKD):   •  Stage 1 with normal or high GFR (GFR > 90 mL/min/1 73 square meters)  •  Stage 2 Mild CKD (GFR = 60-89 mL/min/1 73 square meters)  •  Stage 3A Moderate CKD (GFR = 45-59 mL/min/1 73 square meters)  •  Stage 3B Moderate CKD (GFR = 30-44 mL/min/1 73 square meters)  •  Stage 4 Severe CKD (GFR = 15-29 mL/min/1 73 square meters)  •  Stage 5 End Stage CKD (GFR <15 mL/min/1 73 square meters)  Note: GFR calculation is accurate only with a steady state creatinine    Magnesium [[id number]]  (Normal) Collected: 01/19/23 0502    Lab Status: Final result Specimen: Blood from Arm, Left Updated: 01/19/23 0555     Magnesium 1 9 mg/dL     Phosphorus [[id number]]  (Normal) Collected: 01/19/23 0502    Lab Status: Final result Specimen: Blood from Arm, Left Updated: 01/19/23 0555     Phosphorus 2 7 mg/dL     CBC [[id number]]  (Abnormal) Collected: 01/19/23 0502    Lab Status: Final result Specimen: Blood from Arm, Left Updated: 01/19/23 0534     WBC 5 31 Thousand/uL      RBC 3 84 Million/uL      Hemoglobin 10 1 g/dL      Hematocrit 31 7 %      MCV 83 fL      MCH 26 3 pg      MCHC 31 9 g/dL      RDW 15 5 %      Platelets 083 Thousands/uL      MPV 11 2 fL     FLU/RSV/COVID - if FLU/RSV clinically relevant [[id number]]  (Normal) Collected: 01/18/23 1547    Lab Status: Final result Specimen: Nares from Nose Updated: 01/18/23 1649     SARS-CoV-2 Negative     INFLUENZA A PCR Negative     INFLUENZA B PCR Negative     RSV PCR Negative    Narrative:      FOR PEDIATRIC PATIENTS - copy/paste COVID Guidelines URL to browser: [email or web address]    SARS-CoV-2 assay is a Nucleic Acid Amplification assay intended for the  qualitative detection of nucleic acid from SARS-CoV-2 in nasopharyngeal  swabs  Results are for the presumptive identification of SARS-CoV-2 RNA  Positive results are indicative of infection with SARS-CoV-2, the virus  causing COVID-19, but do not rule out bacterial infection or co-infection  with other viruses  Laboratories within the [place] and its  territories are required to report all positive results to the appropriate  public health authorities  Negative results do not preclude SARS-CoV-2  infection and should not be used as the sole basis for treatment or other  patient management decisions  Negative results must be combined with  clinical observations, patient history, and epidemiological information    This test has not been FDA cleared or approved  This test has been authorized by FDA under an Emergency Use Authorization  (EUA)  This test is only authorized for the duration of time the  declaration that circumstances exist justifying the authorization of the  emergency use of an in vitro diagnostic tests for detection of SARS-CoV-2  virus and/or diagnosis of COVID-19 infection under section 564(b)(1) of  the Act, 21 U  S C  688IHU-5(L)(6), unless the authorization is terminated  or revoked sooner  The test has been validated but independent review by FDA  and CLIA is pending  Test performed using [employer] GeneXpert: This RT-PCR assay targets N2,  a region unique to SARS-CoV-2  A conserved region in the E-gene was chosen  for pan-Sarbecovirus detection which includes SARS-CoV-2  According to CMS-2020-01-R, this platform meets the definition of high-throughput technology      Urine Microscopic [[id number]]  (Normal) Collected: 01/18/23 1555    Lab Status: Final result Specimen: Urine, Clean Catch Updated: 01/18/23 1643     RBC, UA None Seen /hpf      WBC, UA None Seen /hpf      Epithelial Cells Occasional /hpf      Bacteria, UA None Seen /hpf     Lipase [[id number]]  (Normal) Collected: 01/18/23 1547    Lab Status: Final result Specimen: Blood from Arm, Left Updated: 01/18/23 1630     Lipase 30 u/L     Comprehensive metabolic panel [[id number]]  (Abnormal) Collected: 01/18/23 1547    Lab Status: Final result Specimen: Blood from Arm, Left Updated: 01/18/23 1630     Sodium 137 mmol/L      Potassium 4 0 mmol/L      Chloride 104 mmol/L      CO2 25 mmol/L      ANION GAP 8 mmol/L      BUN 8 mg/dL      Creatinine 0 60 mg/dL      Glucose 95 mg/dL      Calcium 9 8 mg/dL       U/L       U/L      Alkaline Phosphatase 148 U/L      Total Protein 7 9 g/dL      Albumin 4 4 g/dL      Total Bilirubin 2 48 mg/dL      eGFR 116 ml/min/1 73sq m     Narrative:      [place] guidelines for Chronic Kidney Disease (CKD):   •  Stage 1 with normal or high GFR (GFR > 90 mL/min/1 73 square meters)  •  Stage 2 Mild CKD (GFR = 60-89 mL/min/1 73 square meters)  •  Stage 3A Moderate CKD (GFR = 45-59 mL/min/1 73 square meters)  •  Stage 3B Moderate CKD (GFR = 30-44 mL/min/1 73 square meters)  •  Stage 4 Severe CKD (GFR = 15-29 mL/min/1 73 square meters)  •  Stage 5 End Stage CKD (GFR <15 mL/min/1 73 square meters)  Note: GFR calculation is accurate only with a steady state creatinine    UA (URINE) with reflex to Scope [[id number]]  (Abnormal) Collected: 01/18/23 1555    Lab Status: Final result Specimen: Urine, Clean Catch Updated: 01/18/23 1624     Color, UA Yellow     Clarity, UA Extra Turbid     Specific [place], UA 1 016     pH, UA 8 5     Leukocytes, UA Negative     Nitrite, UA Negative     Protein, UA 30 (1+) mg/dl      Glucose, UA Negative mg/dl      Ketones, UA Negative mg/dl      Urobilinogen, UA <2 0 mg/dl      Bilirubin, UA Small     Occult Blood, UA Negative    POCT pregnancy, urine [[id number]]  (Normal) Resulted: 01/18/23 1559    Lab Status: Final result Specimen: Urine Updated: 01/18/23 1559     EXT Preg Test, Ur Negative     Control Valid    CBC and differential [[id number]]  (Abnormal) Collected: 01/18/23 1547    Lab Status: Final result Specimen: Blood from Arm, Left Updated: 01/18/23 1557     WBC 4 65 Thousand/uL      RBC 4 44 Million/uL      Hemoglobin 11 8 g/dL      Hematocrit 37 0 %      MCV 83 fL      MCH 26 6 pg      MCHC 31 9 g/dL      RDW 15 5 %      MPV 11 0 fL      Platelets 934 Thousands/uL      nRBC 0 /100 WBCs      Neutrophils Relative 60 %      Immat GRANS % 0 %      Lymphocytes Relative 28 %      Monocytes Relative 9 %      Eosinophils Relative 3 %      Basophils Relative 0 %      Neutrophils Absolute 2 82 Thousands/µL      Immature Grans Absolute 0 01 Thousand/uL      Lymphocytes Absolute 1 28 Thousands/µL      Monocytes Absolute 0 40 Thousand/µL      Eosinophils Absolute 0 12 Thousand/µL      Basophils Absolute 0 02 Thousands/µL                  XR cholangiogram intraoperative   Final Result by Ko Falcon MD (01/20 6275)      Evidence of partially obstructing choledocholithiasis  Please see procedure report for further details  Workstation performed: [id number]         MRI abdomen wo contrast and mrcp   Final Result by Ko Falcon MD (01/19 9974)      1  Slight caliber change of the common bile duct with hypoechogenicity at the transition likely representing a small stone, particularly given small stones in the gallbladder and elevated bilirubin  2   Cholelithiasis with pericholecystic fluid, not present on the prior ultrasound  This is suspicious for acute cholecystitis and clinical correlation is recommended  3   Tiny scattered pancreatic cysts not definitely communicating with the nondilated pancreatic duct  These are nonspecific an follow-up is recommended  For simple cyst(s) less than 1 5 cm, recommend yearly followup 5 times, then every 2 year for 2    times  If cyst(s) stable after 9 years, no further followups  Recommend next followup in 1 year  Preferred imaging modality: abdomen MRI and MRCP with and without IV contrast, or triple phase abdomen CT with IV contrast, or abdomen MRI and MRCP without    IV contrast       4   Mild hepatic steatosis  The recommendations regarding pancreatic findings assumes that patient does not have family history of pancreatic cancer nor have any symptoms potentially attributable to pancreatic cystic lesions (hyperamylasemia, recent-onset diabetes, severe    epigastric pain, weight loss, steatorrhea, or jaundice ) If these conditions are not true, then management should be deferred to judgement of specialists such as gastroenterologists or oncologic surgeons   Recommendations are based on recent consensus    statements on management of pancreatic cystic lesions from [address] [facility], [address] [facility] Rad[facility], the journal Pancreatology, and our own institutional consensus  The study was marked in [facility] for immediate notification  Workstation performed: [id number]         US right upper quadrant   Final Result by Oren Rodrigues MD (01/18 1921)      Cholelithiasis with no evidence of acute cholecystitis  No biliary dilatation  Otherwise, unremarkable right upper quadrant exam       Workstation performed: [id number]         FL ERCP biliary only    (Results Pending)              Procedures  Procedures         ED Course                                             Medical Decision Making  60-year-old female presents with 2-day history of nausea vomiting with upper abdominal pain  Differential diagnosis includes but is not limited to acute biliary colic, acute cholecystitis, choledocholithiasis, pancreatitis, peptic ulcer disease, gastritis, renal colic    Cholelithiasis: complicated acute illness or injury  Hyperbilirubinemia: acute illness or injury  Transaminitis: acute illness or injury  Amount and/or Complexity of Data Reviewed  Independent Historian: michele  External Data Reviewed: notes  Details: Previous visits reviewed  Labs: ordered  Details: Ordered and interpreted by me, patient with transaminitis and hyperbilirubinemia  Radiology: ordered  Details: Ordered and reviewed by me, ultrasound right upper quadrant demonstrates cholelithiasis without other signs concerning for acute cholecystitis  Normal common bile duct  No choledocholithiasis  Risk  Prescription drug management  Decision regarding hospitalization  Risk Details: 60-year-old female presents with 2 days of right upper quadrant pain  Patient's work-up does demonstrate cholelithiasis, suspect cholelithiasis though there is concern for possible choledocholithiasis as she has elevation of her LFTs and bilirubin    Patient will be admitted to the general surgery service with possible GI consult  Disposition  Final diagnoses:   Cholelithiasis   Transaminitis   Hyperbilirubinemia     Time reflects when diagnosis was documented in both MDM as applicable and the Disposition within this note     Time User Action Codes Description Comment    1/18/2023  7:50 PM White, Tressa Add [K80 20] Cholelithiasis     1/18/2023  8:50 PM White, Tressa Add [R74 01] Transaminitis     1/18/2023  8:50 PM White, Tressa Add [E80 6] Hyperbilirubinemia     1/19/2023 12:04 PM Storm Ora Add [K80 50] Choledocholithiasis     1/20/2023 11:03 AM Rachel Rocha Modify [K80 20] Cholelithiasis     1/20/2023 12:30 PM Ana Rad Modify [K80 20] Cholelithiasis       ED Disposition     ED Disposition   Admit    Condition   Stable    Date/Time   Wed Jan 18, 2023  8:50 PM    Comment   Case was discussed with Dr Yaya Godoy and the patient's admission status was agreed to be Admission Status: observation status to the service of Dr Virgilio López   Follow-up Information     Follow up With Specialties Details Why [address], [place] Family Medicine Schedule an appointment as soon as possible for a visit in 1 week(s)  [address] [phone]            Current Discharge Medication List      CONTINUE these medications which have NOT CHANGED    Details   naproxen (NAPROSYN) 500 mg tablet Take 500 mg by mouth 2 (two) times a day with meals      oxymetazoline (AFRIN) 0 05 % nasal spray 2 sprays by Each Nare route 2 (two) times a day             No discharge procedures on file      PDMP Review     None          ED Provider  Electronically Signed by           Luciano Hernandez DO  01/21/23 0126

## 2023-01-19 NOTE — UTILIZATION REVIEW
Initial Clinical Review    Observation 1/18 2051 changed to inpatient 1/20 1420  Pt requiring continued stay for fresh post of after laparoscopic cholecystectomy with intraoperative cholangiogram  Pt requiring continued IV fluids and antibiotics  Admission: Date/Time/Statement:   Admission Orders (From admission, onward)     Ordered        01/20/23 1420  Inpatient Admission  Once            01/18/23 2051  Place in Observation  Once                      Orders Placed This Encounter   Procedures   • Inpatient Admission     Standing Status:   Standing     Number of Occurrences:   1     Order Specific Question:   Level of Care     Answer:   Med Surg [16]     Order Specific Question:   Estimated length of stay     Answer:   More than 2 Midnights     Order Specific Question:   Certification     Answer:   I certify that inpatient services are medically necessary for this patient for a duration of greater than two midnights  See H&P and MD Progress Notes for additional information about the patient's course of treatment  ED Arrival Information     Expected   -    Arrival   1/18/2023 15:04    Acuity   Urgent            Means of arrival   Walk-In    Escorted by   Self    Service   Surgery-General    Admission type   Emergency            Arrival complaint   Abdominal Pain             Chief Complaint   Patient presents with   • Abdominal Pain     Patient c/o epigastric abdominal pain, with N/V  Initial Presentation: 40 y o  female w insignificant PMH presenting with 1 day of abdominal pain localized to the epigastric region and RUQ, associated nausea and emesis, found to have cholelithiasis on imaging without acute cholecystitis, also noted to have elevated total bilirubin   Patient reports onset of abdominal pain mainly localized to the epigastric region yesterday afternoon, she denies any inciting factors or traumatic events, states she had some noodles for lunch earlier in the day but this pain occurred well after she had eaten her meal, describes it as sharp in character  She reports associated nausea with multiple episodes of NB/NB emesis  She was able to eat some small amount of mashed potatoes for dinner last night but was not able to eat that much compared to her normal  She again woke up this morning around 4:30 AM with the same pain, associated nausea and episodes of NB/NB emesis  She has had decreased p o  intake today  Plan: Observation for cholelithiasis: MRCP, CMP in am, GI consult, will tentatively book for laparoscopic cholecystectomy with IOC for tomorrow 1/19, IV ancef/flagyl, NPO at midnight, IV fluids, pain control  1/19:    Surgery: NPO, MRCP pending, GI consult, tentatively booked for lap doris with IOC for today, TBD, IV fluids  Pain control  1/20 Observation changed to inpatient  Surgery: MRCP result from yesterday noted  However given dramatic improvement in her T bili, this is suggestive that the stone has passed      Procedure(s) (LRB):  CHOLECYSTECTOMY LAPAROSCOPIC W/ INTRAOP CHOLANGIOGRAM (N/A)    ED Triage Vitals   Temperature Pulse Respirations Blood Pressure SpO2   01/18/23 1544 01/18/23 1544 01/18/23 1544 01/18/23 1544 01/18/23 1544   98 4 °F (36 9 °C) 85 18 120/75 100 %      Temp Source Heart Rate Source Patient Position - Orthostatic VS BP Location FiO2 (%)   01/18/23 1544 01/18/23 1544 01/18/23 1544 01/18/23 1544 --   Oral Monitor Sitting Right arm       Pain Score       01/18/23 2115       4          Wt Readings from Last 1 Encounters:   03/02/22 83 kg (183 lb)     Additional Vital Signs:     Date/Time Temp Pulse Resp BP MAP (mmHg) SpO2 O2 Device   01/20/23 1226 98 °F (36 7 °C) 72 12 113/62 -- 94 % None (Room air)   01/20/23 1215 98 °F (36 7 °C) 72 17 113/62 81 96 % None (Room air)   01/20/23 1212 98 °F (36 7 °C) 74 13 119/63 -- 95 % None (Room air)   01/20/23 1205 98 °F (36 7 °C) 76 15 119/63 -- 97 % None (Room air)   01/20/23 1200 98 °F (36 7 °C) 68 16 119/63 87 97 % None (Room air)   01/20/23 1156 97 7 °F (36 5 °C) 74 19 127/65 -- 96 % None (Room air)   01/20/23 1145 97 7 °F (36 5 °C) 76 21 127/65 92 100 % None (Room air)   01/20/23 1143 97 7 °F (36 5 °C) 75 20 133/73 96 100 % None (Room air)   01/20/23 0940 98 3 °F (36 8 °C) 81 18 137/63 -- 100 % --   01/20/23 07:27:15 -- 73 -- 119/61 80 96 % --   01/19/23 21:14:03 99 4 °F (37 4 °C) 71 18 120/64 83 97 % None (Room air)   01/19/23 15:46:41 98 9 °F (37 2 °C) -- -- 120/63 82 -- --   01/19/23 0500 -- -- -- -- -- -- None (Room air)   01/18/23 23:18:35 98 7 °F (37 1 °C) -- -- 124/95 105 -- --   01/18/23 2230 -- 65 18 108/55 77 97 % None (Room air)   01/18/23 2215 -- 66 18 101/56 73 98 % None (Room air)   01/18/23 2200 -- 68 16 95/53 71 99 % None (Room air)   01/18/23 2115 -- 72 16 110/58 78 100 % None (Room air)   01/18/23 2015 -- 79 16 112/56 77 100 % None (Room air)   01/18/23 1930 99 °F (37 2 °C) 70 20 130/58 82 100 % None (Room air)     Pertinent Labs/Diagnostic Test Results:   XR cholangiogram intraoperative   Final Result by Roxie Olvera MD (01/20 8925)      Evidence of partially obstructing choledocholithiasis  Please see procedure report for further details  Workstation performed: [id number]         MRI abdomen wo contrast and mrcp   Final Result by Roxie Olvera MD (01/19 3287)      1  Slight caliber change of the common bile duct with hypoechogenicity at the transition likely representing a small stone, particularly given small stones in the gallbladder and elevated bilirubin  2   Cholelithiasis with pericholecystic fluid, not present on the prior ultrasound  This is suspicious for acute cholecystitis and clinical correlation is recommended  3   Tiny scattered pancreatic cysts not definitely communicating with the nondilated pancreatic duct  These are nonspecific an follow-up is recommended   For simple cyst(s) less than 1 5 cm, recommend yearly followup 5 times, then every 2 year for 2 times  If cyst(s) stable after 9 years, no further followups  Recommend next followup in 1 year  Preferred imaging modality: abdomen MRI and MRCP with and without IV contrast, or triple phase abdomen CT with IV contrast, or abdomen MRI and MRCP without    IV contrast       4   Mild hepatic steatosis  The recommendations regarding pancreatic findings assumes that patient does not have family history of pancreatic cancer nor have any symptoms potentially attributable to pancreatic cystic lesions (hyperamylasemia, recent-onset diabetes, severe    epigastric pain, weight loss, steatorrhea, or jaundice ) If these conditions are not true, then management should be deferred to judgement of specialists such as gastroenterologists or oncologic surgeons  Recommendations are based on recent consensus    statements on management of pancreatic cystic lesions from [address] [place] [address] Gastro[employer] Association, [facility] of [facility], the journal Pancreatology, and our own institutional consensus  The study was marked in [facility] for immediate notification  Workstation performed: [id number]         US right upper quadrant   Final Result by Kristie Borges MD (01/18 1921)      Cholelithiasis with no evidence of acute cholecystitis  No biliary dilatation    Otherwise, unremarkable right upper quadrant exam       Workstation performed: [id number]           Results from last 7 days   Lab Units 01/18/23  1547   SARS-COV-2  Negative     Results from last 7 days   Lab Units 01/20/23  0636 01/19/23  0502 01/18/23  1547   WBC Thousand/uL 5 62 5 31 4 65   HEMOGLOBIN g/dL 10 5* 10 1* 11 8   HEMATOCRIT % 32 7* 31 7* 37 0   PLATELETS Thousands/uL 199 188 219   NEUTROS ABS Thousands/µL 3 37  --  2 82         Results from last 7 days   Lab Units 01/20/23  0444 01/19/23  0502 01/18/23  1547   SODIUM mmol/L 136 137 137   POTASSIUM mmol/L 3 8 3 6 4 0   CHLORIDE mmol/L 107 107 104   CO2 mmol/L 21 21 25   ANION GAP mmol/L 8 9 8   BUN mg/dL 8 7 8   CREATININE mg/dL 0 59* 0 51* 0 60   EGFR ml/min/1 73sq m 117 123 116   CALCIUM mg/dL 8 5 8 5 9 8   MAGNESIUM mg/dL  --  1 9  --    PHOSPHORUS mg/dL  --  2 7  --      Results from last 7 days   Lab Units 01/20/23  0444 01/19/23  0502 01/18/23  1547   AST U/L 105* 251* 554*   ALT U/L 269* 400* 632*   ALK PHOS U/L 115* 128* 148*   TOTAL PROTEIN g/dL 6 2* 6 3* 7 9   ALBUMIN g/dL 3 4* 3 6 4 4   TOTAL BILIRUBIN mg/dL 1 02* 2 86* 2 48*   BILIRUBIN DIRECT mg/dL  --  1 89*  --          Results from last 7 days   Lab Units 01/20/23  0444 01/19/23  0502 01/18/23  1547   GLUCOSE RANDOM mg/dL 75 94 95           Results from last 7 days   Lab Units 01/18/23  1547   LIPASE u/L 30                 Results from last 7 days   Lab Units 01/18/23  1555   CLARITY UA  Extra Turbid   COLOR UA  Yellow   SPEC GRAV UA  1 016   PH UA  8 5*   GLUCOSE UA mg/dl Negative   KETONES UA mg/dl Negative   BLOOD UA  Negative   PROTEIN UA mg/dl 30 (1+)*   NITRITE UA  Negative   BILIRUBIN UA  Small*   UROBILINOGEN UA (BE) mg/dl <2 0   LEUKOCYTES UA  Negative   WBC UA /hpf None Seen   RBC UA /hpf None Seen   BACTERIA UA /hpf None Seen   EPITHELIAL CELLS WET PREP /hpf Occasional     Results from last 7 days   Lab Units 01/18/23  1547   INFLUENZA A PCR  Negative   INFLUENZA B PCR  Negative   RSV PCR  Negative       ED Treatment:   Medication Administration from 01/18/2023 1504 to 01/18/2023 2253       Date/Time Order Dose Route Action     01/18/2023 1925 EST sodium chloride 0 9 % bolus 1,000 mL 1,000 mL Intravenous New Bag     01/18/2023 1928 EST HYDROmorphone (DILAUDID) injection 0 5 mg 0 5 mg Intravenous Given     01/18/2023 1925 EST ondansetron (ZOFRAN) injection 4 mg 4 mg Intravenous Given     01/18/2023 2115 EST lactated ringers infusion 125 mL/hr Intravenous New Bag     01/18/2023 2120 EST ceFAZolin (ANCEF) IVPB (premix in dextrose) 1,000 mg 50 mL 1,000 mg Intravenous New Bag     01/18/2023 2149 EST metroNIDAZOLE (FLAGYL) IVPB (premix) 500 mg 100 mL 500 mg Intravenous New Bag        History reviewed  No pertinent past medical history  Present on Admission:  **None**      Admitting Diagnosis: Cholelithiasis [K80 20]  Hyperbilirubinemia [E80 6]  Abdominal pain [R10 9]  Transaminitis [R74 01]  Age/Sex: 40 y o  female  Admission Orders:  Scheduled Medications:  cefazolin, 1,000 mg, Intravenous, Q8H  heparin (porcine), 5,000 Units, Subcutaneous, Q8H LUZ ELENA  metroNIDAZOLE, 500 mg, Intravenous, Q8H  potassium chloride, 40 mEq, Oral, Once      Continuous IV Infusions:  lactated ringers, 125 mL/hr, Intravenous, Continuous      PRN Meds:  HYDROmorphone, 0 5 mg, Intravenous, Q1H PRN  ondansetron, 4 mg, Intravenous, Q6H PRN  oxyCODONE, 5 mg, Oral, Q4H PRN        IP CONSULT TO GASTROENTEROLOGY    Network Utilization Review Department  ATTENTION: Please call with any questions or concerns to [phone] and carefully listen to the prompts so that you are directed to the right person  All voicemails are confidential   Saad Hernandez all requests for admission clinical reviews, approved or denied determinations and any other requests to dedicated fax number below belonging to the campus where the patient is receiving treatment   List of dedicated fax numbers for the Facilities:  [address] DENIALS (Administrative/Medical Necessity) [phone]   [address] (Maternity/NICU/Pediatrics) [phone]   [address] Adeline Cifuentes [phone]   [facility] [phone]   [facility] [phone]   [facility] [address] [address] [place] [address] [place] [address] [address]     [facility] [address] [phone]

## 2023-01-19 NOTE — PROGRESS NOTES
Progress Note - General Surgery   Lia Turner 40 y o  female MRN: [id number]  Unit/Bed#: S -01 Encounter: [id number]    Assessment:  45yoF p/w symptomatic cholelithiasis, associated elevated total bilirubin c/f choledocholithiasis    Vital stable, afebrile  WBC 5 31 from 4 65  Hemoglobin 10 1 from 11 8  Creatinine 0 51 from 0 6  T bili 2 86 from 2 48  AST//400 from 554/632, alk phos 128 from 148    UOP multiple    Plan:  -NPO  -MRCP pending  -GI consult for possible choledocholithiasis, may need ERCP depending on MRCP findings prior to ultimate cholecystectomy  -tentatively booked for lap doris with IOC for today, TBD  -Ancef/Elli [email or web address]  -Pain control  -Encourage ambulation  -SQH    Subjective/Objective   Subjective:   Reports continued abdominal pain mostly localized to the epigastric and RUQ, she states she thinks this pain is improving, had some nausea overnight without any episodes of emesis, remains n p o , not having bowel movement since admission, voiding without issues  Objective:     Blood pressure 124/95, pulse 65, temperature 98 7 °F (37 1 °C), resp  rate 18, last menstrual period 12/26/2022, SpO2 97 %  ,There is no height or weight on file to calculate BMI  Intake/Output Summary (Last 24 hours) at 1/19/2023 0752  Last data filed at 1/18/2023 2230  Gross per 24 hour   Intake 1150 ml   Output --   Net 1150 ml       Invasive Devices     Peripheral Intravenous Line  Duration           Peripheral IV 01/18/23 Right Antecubital <1 day                Physical Exam:   General: NAD  Skin: Warm, dry, anicteric  HEENT: Normocephalic, atraumatic  CV: RRR, no m/r/g  Pulm: CTA b/l, no inc WOB  Abd: Soft, ND, tender in the RUQ and epigastric regions  MSK: Symmetric, no edema, no tenderness, no deformity  Neuro: AOx3, GCS 15    Lab, Imaging and other studies:  I have personally reviewed pertinent lab results    , CBC:   Lab Results   Component Value Date    WBC 5 31 01/19/2023    HGB 10 1 (L) 01/19/2023    HCT 31 7 (L) 01/19/2023    MCV 83 01/19/2023     01/19/2023    MCH 26 3 (L) 01/19/2023    MCHC 31 9 01/19/2023    RDW 15 5 (H) 01/19/2023    MPV 11 2 01/19/2023    NRBC 0 01/18/2023   , CMP:   Lab Results   Component Value Date    SODIUM 137 01/19/2023    K 3 6 01/19/2023     01/19/2023    CO2 21 01/19/2023    BUN 7 01/19/2023    CREATININE 0 51 (L) 01/19/2023    CALCIUM 8 5 01/19/2023     (H) 01/19/2023     (H) 01/19/2023    ALKPHOS 128 (H) 01/19/2023    EGFR 123 01/19/2023     VTE Pharmacologic Prophylaxis: Sequential compression device (Venodyne)  and Heparin  VTE Mechanical Prophylaxis: sequential compression device

## 2023-01-19 NOTE — DISCHARGE INSTR - AVS FIRST PAGE
Incidental findings noted on MRI  Please follow-up with your family physician for additional testing as recommended  MRI abdomen wo contrast and mrcp  Status: Final result     PACS Images     Show images for MRI abdomen wo contrast and mrcp  Study Result    Narrative & Impression   MRI OF THE ABDOMEN WITHOUT CONTRAST WITH MRCP     INDICATION: 40 years / Female  Abdominal pain  COMPARISON: 1/18/2023     TECHNIQUE:  Multiplanar/multisequence MRI of the abdomen with 3D MRCP was performed without the administration of contrast  Imaging performed on 1 5T MRI      FINDINGS:     LOWER CHEST:   Unremarkable  LIVER:  Mild hepatic steatosis  No suspicious mass  Limited evaluation of hepatic veins and portal veins on this non-contrast MRI is unremarkable  BILE DUCTS:  No intrahepatic or extrahepatic bile duct dilation  Common bile duct is normal in caliber though there is a caliber change in the mid to distal portion with the duct measuring 5 mm proximally and 2 mm distally  There is a band of   hypointensity in this region likely represents a small stone resulting in minimal upstream dilatation  GALLBLADDER:  There are multiple small gallstones with mild pericholecystic fluid  PANCREAS:  There are scattered tiny pancreatic cysts seen only on MRCP images with clustered cysts in the head/uncinate process  There is no definite communication with the nondilated main pancreatic duct  ADRENAL GLANDS:  Normal      SPLEEN:  Normal      KIDNEYS/PROXIMAL URETERS:  No hydroureteronephrosis  No suspicious renal mass  BOWEL:  No dilated loops of bowel  PERITONEAL CAVITY/RETROPERITONEUM:  No ascites  No mass  LYMPH NODES:  No abdominal lymphadenopathy  VASCULAR STRUCTURES:  Unremarkable  No aneurysm  ABDOMINAL WALL:  Unremarkable  OSSEOUS STRUCTURES:  No suspicious osseous lesion  IMPRESSION:     1    Slight caliber change of the common bile duct with hypoechogenicity at the transition likely representing a small stone, particularly given small stones in the gallbladder and elevated bilirubin  2   Cholelithiasis with pericholecystic fluid, not present on the prior ultrasound  This is suspicious for acute cholecystitis and clinical correlation is recommended  3   Tiny scattered pancreatic cysts not definitely communicating with the nondilated pancreatic duct  These are nonspecific an follow-up is recommended  For simple cyst(s) less than 1 5 cm, recommend yearly followup 5 times, then every 2 year for 2   times  If cyst(s) stable after 9 years, no further followups  Recommend next followup in 1 year  Preferred imaging modality: abdomen MRI and MRCP with and without IV contrast, or triple phase abdomen CT with IV contrast, or abdomen MRI and MRCP without   IV contrast      4   Mild hepatic steatosis  The recommendations regarding pancreatic findings assumes that patient does not have family history of pancreatic cancer nor have any symptoms potentially attributable to pancreatic cystic lesions (hyperamylasemia, recent-onset diabetes, severe   epigastric pain, weight loss, steatorrhea, or jaundice ) If these conditions are not true, then management should be deferred to judgement of specialists such as gastroenterologists or oncologic surgeons  Recommendations are based on recent consensus   statements on management of pancreatic cystic lesions from [address] [place] [address] Gastroenterology Association, [employer] of Radiology, the journal Pancreatology, and our own institutional consensus  The study was marked in [facility] for immediate notification  ____________________________________________________________________________________________________          Please call the office when you leave to schedule an appointment with  [facility]   for 2 weeks  Please call [phone]                      [facility] [address], [address], [facility], [address]   [address] of [address]  between [employer] [address] [employer]  Activity:    May lift 10 lb as many times as desired the 1st week,       20 lb in 2 weeks,       30 lb in 3 weeks  Walking is encouraged  Normal daily activities including climbing steps are okay  Do not engage in strenuous activity ( sit-ups or crutches) or contact sports for 4-6 weeks post-operatively    Return to Work:   Will discuss at the follow up office visit        Diet:   You may return to your normal healthy diet  Wound Care: Your wound is closed with dissolvable stitches and glue  It is okay to shower  Wash incision gently with soap and water and pat dry  Do not soak incisions in bath water or swim for two weeks  Do not apply any creams or ointments  Pain Medication:   Please take as directed if needed  May use Advil or Motrin in addition  Recall, the pain medicine and anesthesia is associated with constipation  No driving while taking narcotic pain medications  Other: It is normal to developed a “healing ridge” / firm incision after surgery  This is your body making scar tissue  It is a good sign  Constipation is very common after general anesthesia  Please use milk of magnesia as needed in order to help prevent constipation  It is normal to get bruising after surgery  If you have questions after discharge please call the office      If you have increased pain, fever >101 5, increased drainage, redness or a bad smell at your surgery site, please call us immediately or come directly to the Emergency Room

## 2023-01-19 NOTE — CONSULTS
Consultation - GI   Chasity Rivas 40 y o  female MRN: [id number]  Unit/Bed#: S -01 Encounter: [id number]        Assessment:  1  Epigastric abdominal pain with nausea and vomiting  2   Elevated LFTs  Recent Labs     01/18/23  1547 01/19/23  0502   * 251*   * 400*   ALKPHOS 148* 128*   TBILI 2 48* 2 86*   BILIDIR  --  1 89*       RUQ US : Cholelithiasis with no evidence of acute cholecystitis  No biliary dilatation    Symptoms could be secondary to symptoms cholelithiasis , choledocholiasthis or passed stones  Less likely PUD with associated elevated LFTs  Plan:  1  Continue with IV hydration and antiemetics  NPO for now  2  Follow up MRCP results to rule out choledocholithiasis  3  Monitor LFTs  4  Antibiotics per surgery    Will discuss with Dr Anahi Sneed  History of present illness  Physician Requesting Consult: Jenaro Lovelace MD  Reason for Consult / Principal Problem: Abdominal pain/vomiting with elevated LFTs  Hx and PE limited by: none   HPI: Chasity Rivas is a 40y o  year old female with no significant past medical history who presents with 2 days complaints of epigastric /RUQ pain associated with frequent nausea and vomiting  She reports that the pain radiates everywhere when it is very severe  She reports chronic constipation with last bowel movement was on Sunday  Denies fever, chills, hematemesis, dark stool or diarrhea  She denies taking new medications including NSAIDs  Last meal before symptoms started was noodles soup  Denies eating raw meat or seafood  On admission RUQ US is notable for cholelithiasis with no signs of cholecystitis or biliary obstruction  Laboratory work-up was notable for elevated liver enzymes  MRCP was ordered by her surgery team to rule out choledocholithiasis and GI service was consulted for further evaluation and management       Inpatient consult to gastroenterology  Consult performed by: Mary Anne Fuller MD  Consult ordered by: Ellis Lott Kwabena Friend MD          Review of Systems   Constitutional: Negative for chills and fever  HENT: Negative for ear pain and sore throat  Eyes: Negative for pain and visual disturbance  Respiratory: Negative for cough and shortness of breath  Cardiovascular: Negative for chest pain and palpitations  Gastrointestinal: Positive for abdominal pain, constipation, nausea and vomiting  Negative for diarrhea  Genitourinary: Negative for dysuria and hematuria  Musculoskeletal: Negative for arthralgias and back pain  Skin: Negative for color change and rash  Neurological: Negative for seizures and syncope  All other systems reviewed and are negative  Historical Information   History reviewed  No pertinent past medical history  Past Surgical History:   Procedure Laterality Date   •  SECTION       Social History   Social History     Substance and Sexual Activity   Alcohol Use Not Currently    Comment: occasionally     Social History     Substance and Sexual Activity   Drug Use Not Currently     E-Cigarette/Vaping   • E-Cigarette Use Never User      E-Cigarette/Vaping Substances     Social History     Tobacco Use   Smoking Status Never   Smokeless Tobacco Never     Family History: non-contributory    Meds/Allergies   all current active meds have been reviewed    No Known Allergies    Objective       Intake/Output Summary (Last 24 hours) at 2023 9683  Last data filed at 2023 2230  Gross per 24 hour   Intake 1150 ml   Output --   Net 1150 ml       Invasive Devices:   Peripheral IV 23 Right Antecubital (Active)   Site Assessment WDL; Clean;Dry; Intact 23 0000   Dressing Type Transparent 23 0000   Line Status Infusing 23 0000   Dressing Status Clean;Dry; Intact 23 0000   Dressing Change Due 23 0000   Reason Not Rotated Not due 23 0000       Physical Exam  Vitals and nursing note reviewed     Constitutional:       General: She is not in acute distress  Appearance: She is well-developed  She is ill-appearing  HENT:      Head: Normocephalic and atraumatic  Eyes:      Conjunctiva/sclera: Conjunctivae normal    Cardiovascular:      Rate and Rhythm: Normal rate and regular rhythm  Heart sounds: No murmur heard  Pulmonary:      Effort: Pulmonary effort is normal  No respiratory distress  Breath sounds: Normal breath sounds  Abdominal:      Palpations: Abdomen is soft  Tenderness: There is abdominal tenderness (eoigastric/RUQ)  There is no guarding or rebound  Musculoskeletal:         General: No swelling  Cervical back: Neck supple  Skin:     General: Skin is warm and dry  Capillary Refill: Capillary refill takes less than 2 seconds  Neurological:      General: No focal deficit present  Mental Status: She is alert and oriented to person, place, and time  Psychiatric:         Mood and Affect: Mood normal          Behavior: Behavior normal          Lab Results: I have personally reviewed pertinent reports  Imaging Studies: I have personally reviewed pertinent reports  EKG, Pathology, and Other Studies: I have personally reviewed pertinent reports  VTE Prophylaxis: per primary     Counseling / Coordination of Care  Total floor / unit time spent today 64 minutes  Greater than 50% of total time was spent with the patient and / or family counseling and / or coordination of care

## 2023-01-19 NOTE — PLAN OF CARE
Problem: GASTROINTESTINAL - ADULT  Goal: Minimal or absence of nausea and/or vomiting  Description: INTERVENTIONS:  - Administer IV fluids if ordered to ensure adequate hydration  - Maintain NPO status until nausea and vomiting are resolved  - Nasogastric tube if ordered  - Administer ordered antiemetic medications as needed  - Provide nonpharmacologic comfort measures as appropriate  - Advance diet as tolerated, if ordered  - Consider nutrition services referral to assist patient with adequate nutrition and appropriate food choices  Outcome: Progressing  Goal: Maintains or returns to baseline bowel function  Description: INTERVENTIONS:  - Assess bowel function  - Encourage oral fluids to ensure adequate hydration  - Administer IV fluids if ordered to ensure adequate hydration  - Administer ordered medications as needed  - Encourage mobilization and activity  - Consider nutritional services referral to assist patient with adequate nutrition and appropriate food choices  Outcome: Progressing  Goal: Maintains adequate nutritional intake  Description: INTERVENTIONS:  - Monitor percentage of each meal consumed  - Identify factors contributing to decreased intake, treat as appropriate  - Assist with meals as needed  - Monitor I&O, weight, and lab values if indicated  - Obtain nutrition services referral as needed  Outcome: Progressing  Goal: Oral mucous membranes remain intact  Description: INTERVENTIONS  - Assess oral mucosa and hygiene practices  - Implement preventative oral hygiene regimen  - Implement oral medicated treatments as ordered  - Initiate Nutrition services referral as needed  Outcome: Progressing

## 2023-01-20 ENCOUNTER — APPOINTMENT (OUTPATIENT)
Dept: RADIOLOGY | Facility: HOSPITAL | Age: 38
End: 2023-01-20

## 2023-01-20 PROBLEM — K80.00 ACUTE CALCULOUS CHOLECYSTITIS: Status: ACTIVE | Noted: 2023-01-19

## 2023-01-20 PROBLEM — E66.9 OBESITY (BMI 30-39.9): Status: ACTIVE | Noted: 2023-01-20

## 2023-01-20 PROBLEM — K80.50 CHOLEDOCHOLITHIASIS: Status: ACTIVE | Noted: 2023-01-20

## 2023-01-20 LAB
ALBUMIN SERPL BCP-MCNC: 3.4 G/DL (ref 3.5–5)
ALP SERPL-CCNC: 115 U/L (ref 34–104)
ALT SERPL W P-5'-P-CCNC: 269 U/L (ref 7–52)
ANION GAP SERPL CALCULATED.3IONS-SCNC: 8 MMOL/L (ref 4–13)
AST SERPL W P-5'-P-CCNC: 105 U/L (ref 13–39)
BASOPHILS # BLD AUTO: 0.03 THOUSANDS/ÂΜL (ref 0–0.1)
BASOPHILS NFR BLD AUTO: 1 % (ref 0–1)
BILIRUB SERPL-MCNC: 1.02 MG/DL (ref 0.2–1)
BUN SERPL-MCNC: 8 MG/DL (ref 5–25)
CALCIUM ALBUM COR SERPL-MCNC: 9 MG/DL (ref 8.3–10.1)
CALCIUM SERPL-MCNC: 8.5 MG/DL (ref 8.4–10.2)
CHLORIDE SERPL-SCNC: 107 MMOL/L (ref 96–108)
CO2 SERPL-SCNC: 21 MMOL/L (ref 21–32)
CREAT SERPL-MCNC: 0.59 MG/DL (ref 0.6–1.3)
EOSINOPHIL # BLD AUTO: 0.14 THOUSAND/ÂΜL (ref 0–0.61)
EOSINOPHIL NFR BLD AUTO: 3 % (ref 0–6)
ERYTHROCYTE [DISTWIDTH] IN BLOOD BY AUTOMATED COUNT: 15.7 % (ref 11.6–15.1)
GFR SERPL CREATININE-BSD FRML MDRD: 117 ML/MIN/1.73SQ M
GLUCOSE P FAST SERPL-MCNC: 75 MG/DL (ref 65–99)
GLUCOSE SERPL-MCNC: 75 MG/DL (ref 65–140)
HCT VFR BLD AUTO: 32.7 % (ref 34.8–46.1)
HGB BLD-MCNC: 10.5 G/DL (ref 11.5–15.4)
IMM GRANULOCYTES # BLD AUTO: 0.01 THOUSAND/UL (ref 0–0.2)
IMM GRANULOCYTES NFR BLD AUTO: 0 % (ref 0–2)
LYMPHOCYTES # BLD AUTO: 1.65 THOUSANDS/ÂΜL (ref 0.6–4.47)
LYMPHOCYTES NFR BLD AUTO: 29 % (ref 14–44)
MCH RBC QN AUTO: 26.6 PG (ref 26.8–34.3)
MCHC RBC AUTO-ENTMCNC: 32.1 G/DL (ref 31.4–37.4)
MCV RBC AUTO: 83 FL (ref 82–98)
MONOCYTES # BLD AUTO: 0.42 THOUSAND/ÂΜL (ref 0.17–1.22)
MONOCYTES NFR BLD AUTO: 8 % (ref 4–12)
NEUTROPHILS # BLD AUTO: 3.37 THOUSANDS/ÂΜL (ref 1.85–7.62)
NEUTS SEG NFR BLD AUTO: 59 % (ref 43–75)
NRBC BLD AUTO-RTO: 0 /100 WBCS
PLATELET # BLD AUTO: 199 THOUSANDS/UL (ref 149–390)
PMV BLD AUTO: 11.7 FL (ref 8.9–12.7)
POTASSIUM SERPL-SCNC: 3.8 MMOL/L (ref 3.5–5.3)
PROT SERPL-MCNC: 6.2 G/DL (ref 6.4–8.4)
RBC # BLD AUTO: 3.95 MILLION/UL (ref 3.81–5.12)
SODIUM SERPL-SCNC: 136 MMOL/L (ref 135–147)
WBC # BLD AUTO: 5.62 THOUSAND/UL (ref 4.31–10.16)

## 2023-01-20 PROCEDURE — BF131ZZ FLUOROSCOPY OF GALLBLADDER AND BILE DUCTS USING LOW OSMOLAR CONTRAST: ICD-10-PCS | Performed by: SURGERY

## 2023-01-20 PROCEDURE — 0FT44ZZ RESECTION OF GALLBLADDER, PERCUTANEOUS ENDOSCOPIC APPROACH: ICD-10-PCS | Performed by: SURGERY

## 2023-01-20 RX ORDER — ONDANSETRON 2 MG/ML
4 INJECTION INTRAMUSCULAR; INTRAVENOUS ONCE AS NEEDED
Status: DISCONTINUED | OUTPATIENT
Start: 2023-01-20 | End: 2023-01-20 | Stop reason: HOSPADM

## 2023-01-20 RX ORDER — GLYCOPYRROLATE 0.2 MG/ML
INJECTION INTRAMUSCULAR; INTRAVENOUS AS NEEDED
Status: DISCONTINUED | OUTPATIENT
Start: 2023-01-20 | End: 2023-01-20

## 2023-01-20 RX ORDER — LABETALOL HYDROCHLORIDE 5 MG/ML
10 INJECTION, SOLUTION INTRAVENOUS
Status: DISCONTINUED | OUTPATIENT
Start: 2023-01-20 | End: 2023-01-20 | Stop reason: HOSPADM

## 2023-01-20 RX ORDER — METOCLOPRAMIDE HYDROCHLORIDE 5 MG/ML
10 INJECTION INTRAMUSCULAR; INTRAVENOUS ONCE AS NEEDED
Status: DISCONTINUED | OUTPATIENT
Start: 2023-01-20 | End: 2023-01-20 | Stop reason: HOSPADM

## 2023-01-20 RX ORDER — HYDROMORPHONE HCL/PF 1 MG/ML
SYRINGE (ML) INJECTION AS NEEDED
Status: DISCONTINUED | OUTPATIENT
Start: 2023-01-20 | End: 2023-01-20

## 2023-01-20 RX ORDER — FENTANYL CITRATE 50 UG/ML
INJECTION, SOLUTION INTRAMUSCULAR; INTRAVENOUS AS NEEDED
Status: DISCONTINUED | OUTPATIENT
Start: 2023-01-20 | End: 2023-01-20

## 2023-01-20 RX ORDER — LIDOCAINE HYDROCHLORIDE 10 MG/ML
INJECTION, SOLUTION EPIDURAL; INFILTRATION; INTRACAUDAL; PERINEURAL AS NEEDED
Status: DISCONTINUED | OUTPATIENT
Start: 2023-01-20 | End: 2023-01-20

## 2023-01-20 RX ORDER — HYDROMORPHONE HCL IN WATER/PF 6 MG/30 ML
0.2 PATIENT CONTROLLED ANALGESIA SYRINGE INTRAVENOUS
Status: DISCONTINUED | OUTPATIENT
Start: 2023-01-20 | End: 2023-01-20 | Stop reason: HOSPADM

## 2023-01-20 RX ORDER — PROPOFOL 10 MG/ML
INJECTION, EMULSION INTRAVENOUS AS NEEDED
Status: DISCONTINUED | OUTPATIENT
Start: 2023-01-20 | End: 2023-01-20

## 2023-01-20 RX ORDER — SUCCINYLCHOLINE/SOD CL,ISO/PF 100 MG/5ML
SYRINGE (ML) INTRAVENOUS AS NEEDED
Status: DISCONTINUED | OUTPATIENT
Start: 2023-01-20 | End: 2023-01-20

## 2023-01-20 RX ORDER — MIDAZOLAM HYDROCHLORIDE 2 MG/2ML
INJECTION, SOLUTION INTRAMUSCULAR; INTRAVENOUS AS NEEDED
Status: DISCONTINUED | OUTPATIENT
Start: 2023-01-20 | End: 2023-01-20

## 2023-01-20 RX ORDER — MAGNESIUM HYDROXIDE 1200 MG/15ML
LIQUID ORAL AS NEEDED
Status: DISCONTINUED | OUTPATIENT
Start: 2023-01-20 | End: 2023-01-20 | Stop reason: HOSPADM

## 2023-01-20 RX ORDER — FENTANYL CITRATE/PF 50 MCG/ML
25 SYRINGE (ML) INJECTION
Status: DISCONTINUED | OUTPATIENT
Start: 2023-01-20 | End: 2023-01-20 | Stop reason: HOSPADM

## 2023-01-20 RX ORDER — HYDROMORPHONE HCL/PF 1 MG/ML
0.5 SYRINGE (ML) INJECTION
Status: DISCONTINUED | OUTPATIENT
Start: 2023-01-20 | End: 2023-01-20 | Stop reason: HOSPADM

## 2023-01-20 RX ORDER — PROMETHAZINE HYDROCHLORIDE 25 MG/ML
12.5 INJECTION, SOLUTION INTRAMUSCULAR; INTRAVENOUS ONCE AS NEEDED
Status: DISCONTINUED | OUTPATIENT
Start: 2023-01-20 | End: 2023-01-20 | Stop reason: HOSPADM

## 2023-01-20 RX ORDER — ONDANSETRON 2 MG/ML
INJECTION INTRAMUSCULAR; INTRAVENOUS AS NEEDED
Status: DISCONTINUED | OUTPATIENT
Start: 2023-01-20 | End: 2023-01-20

## 2023-01-20 RX ORDER — SODIUM CHLORIDE, SODIUM LACTATE, POTASSIUM CHLORIDE, CALCIUM CHLORIDE 600; 310; 30; 20 MG/100ML; MG/100ML; MG/100ML; MG/100ML
125 INJECTION, SOLUTION INTRAVENOUS CONTINUOUS
Status: DISCONTINUED | OUTPATIENT
Start: 2023-01-20 | End: 2023-01-21

## 2023-01-20 RX ORDER — CEFAZOLIN SODIUM 1 G/3ML
INJECTION, POWDER, FOR SOLUTION INTRAMUSCULAR; INTRAVENOUS AS NEEDED
Status: DISCONTINUED | OUTPATIENT
Start: 2023-01-20 | End: 2023-01-20

## 2023-01-20 RX ORDER — ALBUTEROL SULFATE 2.5 MG/3ML
2.5 SOLUTION RESPIRATORY (INHALATION) ONCE AS NEEDED
Status: DISCONTINUED | OUTPATIENT
Start: 2023-01-20 | End: 2023-01-20 | Stop reason: HOSPADM

## 2023-01-20 RX ORDER — SODIUM CHLORIDE, SODIUM LACTATE, POTASSIUM CHLORIDE, CALCIUM CHLORIDE 600; 310; 30; 20 MG/100ML; MG/100ML; MG/100ML; MG/100ML
INJECTION, SOLUTION INTRAVENOUS CONTINUOUS PRN
Status: DISCONTINUED | OUTPATIENT
Start: 2023-01-20 | End: 2023-01-20

## 2023-01-20 RX ORDER — HYDRALAZINE HYDROCHLORIDE 20 MG/ML
5 INJECTION INTRAMUSCULAR; INTRAVENOUS
Status: DISCONTINUED | OUTPATIENT
Start: 2023-01-20 | End: 2023-01-20 | Stop reason: HOSPADM

## 2023-01-20 RX ORDER — PROPOFOL 10 MG/ML
INJECTION, EMULSION INTRAVENOUS CONTINUOUS PRN
Status: DISCONTINUED | OUTPATIENT
Start: 2023-01-20 | End: 2023-01-20

## 2023-01-20 RX ORDER — BUPIVACAINE HYDROCHLORIDE AND EPINEPHRINE 2.5; 5 MG/ML; UG/ML
INJECTION, SOLUTION EPIDURAL; INFILTRATION; INTRACAUDAL; PERINEURAL AS NEEDED
Status: DISCONTINUED | OUTPATIENT
Start: 2023-01-20 | End: 2023-01-20 | Stop reason: HOSPADM

## 2023-01-20 RX ORDER — HYDROMORPHONE HCL/PF 1 MG/ML
0.5 SYRINGE (ML) INJECTION
Status: DISCONTINUED | OUTPATIENT
Start: 2023-01-20 | End: 2023-01-23 | Stop reason: HOSPADM

## 2023-01-20 RX ORDER — ROCURONIUM BROMIDE 10 MG/ML
INJECTION, SOLUTION INTRAVENOUS AS NEEDED
Status: DISCONTINUED | OUTPATIENT
Start: 2023-01-20 | End: 2023-01-20

## 2023-01-20 RX ORDER — NEOSTIGMINE METHYLSULFATE 1 MG/ML
INJECTION INTRAVENOUS AS NEEDED
Status: DISCONTINUED | OUTPATIENT
Start: 2023-01-20 | End: 2023-01-20

## 2023-01-20 RX ORDER — SODIUM CHLORIDE 9 MG/ML
INJECTION, SOLUTION INTRAVENOUS AS NEEDED
Status: DISCONTINUED | OUTPATIENT
Start: 2023-01-20 | End: 2023-01-20 | Stop reason: HOSPADM

## 2023-01-20 RX ORDER — DEXAMETHASONE SODIUM PHOSPHATE 10 MG/ML
INJECTION, SOLUTION INTRAMUSCULAR; INTRAVENOUS AS NEEDED
Status: DISCONTINUED | OUTPATIENT
Start: 2023-01-20 | End: 2023-01-20

## 2023-01-20 RX ADMIN — PROPOFOL 150 MG: 10 INJECTION, EMULSION INTRAVENOUS at 10:20

## 2023-01-20 RX ADMIN — DEXAMETHASONE SODIUM PHOSPHATE 10 MG: 10 INJECTION, SOLUTION INTRAMUSCULAR; INTRAVENOUS at 10:29

## 2023-01-20 RX ADMIN — ROCURONIUM BROMIDE 5 MG: 10 SOLUTION INTRAVENOUS at 11:07

## 2023-01-20 RX ADMIN — NEOSTIGMINE METHYLSULFATE 4 MG: 1 INJECTION INTRAVENOUS at 11:25

## 2023-01-20 RX ADMIN — LIDOCAINE HYDROCHLORIDE 50 MG: 10 INJECTION, SOLUTION EPIDURAL; INFILTRATION; INTRACAUDAL at 10:20

## 2023-01-20 RX ADMIN — GLYCOPYRROLATE 0.8 MG: 0.2 INJECTION, SOLUTION INTRAMUSCULAR; INTRAVENOUS at 11:25

## 2023-01-20 RX ADMIN — CEFAZOLIN 1000 MG: 1 INJECTION, POWDER, FOR SOLUTION INTRAMUSCULAR; INTRAVENOUS at 10:27

## 2023-01-20 RX ADMIN — CEFAZOLIN SODIUM 1000 MG: 1 SOLUTION INTRAVENOUS at 10:19

## 2023-01-20 RX ADMIN — HEPARIN SODIUM 5000 UNITS: 5000 INJECTION INTRAVENOUS; SUBCUTANEOUS at 21:51

## 2023-01-20 RX ADMIN — SODIUM CHLORIDE, SODIUM LACTATE, POTASSIUM CHLORIDE, AND CALCIUM CHLORIDE: .6; .31; .03; .02 INJECTION, SOLUTION INTRAVENOUS at 11:04

## 2023-01-20 RX ADMIN — FENTANYL CITRATE 25 MCG: 50 INJECTION INTRAMUSCULAR; INTRAVENOUS at 12:12

## 2023-01-20 RX ADMIN — METRONIDAZOLE 500 MG: 5 INJECTION, SOLUTION INTRAVENOUS at 21:15

## 2023-01-20 RX ADMIN — OXYCODONE HYDROCHLORIDE 5 MG: 5 TABLET ORAL at 14:22

## 2023-01-20 RX ADMIN — PROPOFOL 50 MCG/KG/MIN: 10 INJECTION, EMULSION INTRAVENOUS at 10:20

## 2023-01-20 RX ADMIN — Medication 120 MG: at 10:20

## 2023-01-20 RX ADMIN — FENTANYL CITRATE 25 MCG: 50 INJECTION INTRAMUSCULAR; INTRAVENOUS at 12:05

## 2023-01-20 RX ADMIN — PROPOFOL 30 MG: 10 INJECTION, EMULSION INTRAVENOUS at 10:43

## 2023-01-20 RX ADMIN — SODIUM CHLORIDE, SODIUM LACTATE, POTASSIUM CHLORIDE, AND CALCIUM CHLORIDE 125 ML/HR: .6; .31; .03; .02 INJECTION, SOLUTION INTRAVENOUS at 14:22

## 2023-01-20 RX ADMIN — ROCURONIUM BROMIDE 30 MG: 10 SOLUTION INTRAVENOUS at 10:27

## 2023-01-20 RX ADMIN — HYDROMORPHONE HYDROCHLORIDE 0.5 MG: 1 INJECTION, SOLUTION INTRAMUSCULAR; INTRAVENOUS; SUBCUTANEOUS at 20:16

## 2023-01-20 RX ADMIN — METRONIDAZOLE 500 MG: 5 INJECTION, SOLUTION INTRAVENOUS at 07:24

## 2023-01-20 RX ADMIN — HYDROMORPHONE HYDROCHLORIDE 0.5 MG: 1 INJECTION, SOLUTION INTRAMUSCULAR; INTRAVENOUS; SUBCUTANEOUS at 10:39

## 2023-01-20 RX ADMIN — FENTANYL CITRATE 25 MCG: 50 INJECTION INTRAMUSCULAR; INTRAVENOUS at 11:56

## 2023-01-20 RX ADMIN — MIDAZOLAM HYDROCHLORIDE 2 MG: 1 INJECTION, SOLUTION INTRAMUSCULAR; INTRAVENOUS at 10:16

## 2023-01-20 RX ADMIN — CEFAZOLIN SODIUM 1000 MG: 1 SOLUTION INTRAVENOUS at 21:15

## 2023-01-20 RX ADMIN — ONDANSETRON 4 MG: 2 INJECTION INTRAMUSCULAR; INTRAVENOUS at 10:29

## 2023-01-20 RX ADMIN — SODIUM CHLORIDE, SODIUM LACTATE, POTASSIUM CHLORIDE, AND CALCIUM CHLORIDE: .6; .31; .03; .02 INJECTION, SOLUTION INTRAVENOUS at 10:16

## 2023-01-20 RX ADMIN — HYDROMORPHONE HYDROCHLORIDE 0.5 MG: 1 INJECTION, SOLUTION INTRAMUSCULAR; INTRAVENOUS; SUBCUTANEOUS at 10:43

## 2023-01-20 RX ADMIN — HYDROMORPHONE HYDROCHLORIDE 0.5 MG: 1 INJECTION, SOLUTION INTRAMUSCULAR; INTRAVENOUS; SUBCUTANEOUS at 16:08

## 2023-01-20 RX ADMIN — CEFAZOLIN SODIUM 1000 MG: 1 SOLUTION INTRAVENOUS at 05:54

## 2023-01-20 RX ADMIN — FENTANYL CITRATE 100 MCG: 50 INJECTION, SOLUTION INTRAMUSCULAR; INTRAVENOUS at 10:20

## 2023-01-20 NOTE — PROGRESS NOTES
General Surgery  Progress Note   Sana Elizondo 40 y o  female MRN: [id number]  Unit/Bed#: S -01 Encounter: [id number]    Assessment:  45yoF p/w symptomatic cholelithiasis, associated elevated total bilirubin c/f choledocholithiasis    Afebrile, VSS  WBC: 5 26; Tbili: 1 02 from 2 86    Plan:  -NPO  -Plan for Lap cholecystectomy with IOC  -Possible post op ERCP  -Pain and nausea control as needed  -Continue ancef/flagyl  -DVT ppx    Subjective/Objective     Subjective: Patient seen and examined at bedside, in no acute distress  No acute events overnight  Patient's pain is well controlled  Pt denies nausea or vomiting  Passing gas and stool  Objective:     Vitals:Blood pressure 119/61, pulse 73, temperature 99 4 °F (37 4 °C), temperature source Oral, resp  rate 18, last menstrual period 2022, SpO2 96 %  ,There is no height or weight on file to calculate BMI  Temp (24hrs), Av 2 °F (37 3 °C), Min:98 9 °F (37 2 °C), Max:99 4 °F (37 4 °C)  Current: Temperature: 99 4 °F (37 4 °C)    No intake or output data in the 24 hours ending 23 0845    Invasive Devices     Peripheral Intravenous Line  Duration           Peripheral IV 23 Right Antecubital 1 day                Physical Exam:  General: No acute distress, alert and oriented  CV: Well perfused, regular rate and rhythm  Lungs: Normal work of breathing, no increased respiratory effort  Abdomen: Soft, non-tender, non-distended     Extremities: No edema, clubbing or cyanosis  Skin: Warm, dry    Lab Results: Results: I have personally reviewed all pertinent laboratory/tests results  VTE Prophylaxis: Sequential compression device (Venodyne)  and Heparin    Ying Zelaya MD  2023

## 2023-01-20 NOTE — PROGRESS NOTES
Progress Note - General Surgery   [facility] 40 y o  female MRN: [id number]  Unit/Bed#: S -01 Encounter: [id number]        Subjective/Objective     Subjective: Ethel in some pain but improved since her admission    Blood pressure 119/61, pulse 73, temperature 99 4 °F (37 4 °C), temperature source Oral, resp  rate 18, last menstrual period 12/26/2022, SpO2 96 %  ,There is no height or weight on file to calculate BMI  No intake or output data in the 24 hours ending 01/20/23 0830        Physical Exam:   Abdomen is soft only scant epigastric discomfort      T bili is now 1 02  Assessment:  calculus cholecystitis  MRCP result from yesterday noted  However given dramatic improvement in her T bili, this is suggestive that the stone has passed      Plan:  Discussed risks and benefits of a laparoscopic cholecystectomy with intraoperative cholangiogram   Discussed risks and benefits of a laparoscopic cholecystectomy with patient and  and they agree to proceed  Determine whether ERCP may be needed pending results of the cholangiogram    Kalee Hou DO  1/20/2023  8:30 AM

## 2023-01-20 NOTE — OP NOTE
OPERATIVE REPORT  PATIENT NAME: Shae Harden    :  1985  MRN: [id number]  Pt Location: AN OR ROOM 03    SURGERY DATE: 2023    Surgeon(s) and Role:     * Augie Franklin DO - Primary     * Lupe Kay PA-C - Assisting no qualified resident was available  Her assistance was required for exposure and retraction throughout the case    Preop Diagnosis:  Cholelithiasis [K80 20]    Posterior operative diagnosis:   Acute calculus cholecystitis  choledocholithiasis    Procedure(s) (LRB):  CHOLECYSTECTOMY LAPAROSCOPIC W/ INTRAOP CHOLANGIOGRAM (N/A)    Specimen(s):  ID Type Source Tests Collected by Time Destination   1 :  Tissue Gallbladder TISSUE EXAM James Hou DO 2023 1103        Estimated Blood Loss:   Minimal    Drains:  * No LDAs found *    Anesthesia Type:   General/local    Operative Indications:  Cholelithiasis [K80 20]      Operative Findings:  Intraoperative cholangiogram revealed abrupt cut off of the mid common duct likely consistent with a small chip like stone  Although the dye got around it there was certainly a change in caliber at the mid level common duct  Gallbladder was distended with edema throughout consistent with acute calculus cholecystitis  Height 65 inches weight 83 kg 803 pounds  BMI 30  ASA 2  Wound class II    Complications:   None    Procedure and Technique:  Patient was brought the operative suite and identified by visualization, conversation, by armband  Sequential compression pumps were placed  She was given Ancef perioperatively  Once under anesthesia abdomen was then prepped and draped in a sterile fashion  Timeout was performed was assured that the prep was dry  Local was instilled at the umbilicus  Vertical skin incision was made and the underlying subcutaneous tissue was bluntly dissected with Greenfield clamps down to the fascia  Fascia was lifted up and divided the midline    Poked through the underlying peritoneum gaining access into the abdominal cavity  An 11 mm trocar was placed under direct visualization  CO2 was attached crating a pneumoperitoneum 3 other 5 mm bladeless trochars were then placed in the right upper quadrant  The gallbladder was certainly distended and mildly tense  This was able to be grasped and lifted cephalad  Omental adhesions to the undersurface of the gallbladder were stripped away  The neck was somewhat folded upon itself  I carefully divided the visceral peritoneum to help straighten the neck of the gallbladder out  Careful dissection was carried out to identify both cystic duct and cystic artery structures  This was aided with an edematous plane in this region  Cystic artery was divided to ligaclips  Careful dissection was carried out to assure we had the full length of the cystic duct  This was clipped at the junction of the duct and gallbladder  Small cut was made in the distal cystic duct  Initially clear green bile emanated  A small chip like stone was able to be extracted from the cystic duct  Cholangiogram was performed using a TAUT cholangiogram catheter  There was good flow of dye into the hepatic radicles and down the common duct  The I eventually did flow throughout the duct into the duodenum  However there was an abrupt caliber change in the mid common duct  Even though there was some dye flow around it this appears to be consistent with a retained common duct chip like stone  Cholangiogram catheter was removed  Proximal cystic duct was triply clipped  Gallbladder sent taken off the liver using variable speed the harmonic scalpel  There was excellent hemostasis  Gallbladder is placed into an Endo Catch bag  Copious irrigation was carried out  Omentum was placed into the gallbladder fossa  Pneumoperitoneum was evacuated  Gallbladder is brought to the umbilical port site  Other trochars removed    Fascia at the umbilical port site was closing 0 Vicryl in a figure-of-eight fashion  More local was instilled  4-0 Monocryl was used to close all skin incisions in a subcuticular fashion  Wounds were washed and dried  Sterile skin glue was applied  She was awakened in the operating room and returned to the recovery area in stable condition having tolerated the procedure well     I was present for the entire procedure    Patient Disposition:  PACU         SIGNATURE: Hubert Burkitt, DO  DATE: January 20, 2023  TIME: 11:21 AM

## 2023-01-20 NOTE — ANESTHESIA POSTPROCEDURE EVALUATION
Post-Op Assessment Note    CV Status:  Stable    Pain management: adequate     Mental Status:  Lethargic and sleepy   Hydration Status:  Stable   PONV Controlled:  None   Airway Patency:  Patent      Post Op Vitals Reviewed: Yes      Staff: CRNA         No notable events documented      BP   133/73   Temp   97 7   Pulse  86   Resp   16   SpO2   100

## 2023-01-20 NOTE — QUICK NOTE
S: Cortney Isaacs is a 40 y o  female who returns to the floor s/p laparoscopic cholecystectomy w IOC, positive finding of stone in the mid CBD  EBL minimal  Patient tolerated the procedure well, without complications, was extubated in the OR, returned to PACU in stable condition  Patient report some pain mainly in the RUQ post operatively, also reports some gassy pain, is tolerating clears without nausea or emesis, not yet passing flatus or having bowel movements, voiding without issues, ambulating, using incentive spirometry      O:    Vitals:    01/20/23 1449   BP: 124/73   Pulse: 70   Resp:    Temp: 99 3 °F (37 4 °C)   SpO2: 96%     General: NAD  Skin: Warm, dry, anicteric  HEENT: Normocephalic, atraumatic  CV: RRR, no m/r/g  Pulm: CTA b/l, no inc WOB  Abd: Soft, ND, minimally tender around the incisions and RUQ  MSK: Symmetric, no edema, no tenderness, no deformity  Neuro: AOx3, GCS 15    A: 37yoF p/w symptomatic cholelithiasis and choledocholithiasis, now s/p laparoscopic cholecystectomy w IOC on 1/20, found to have stone in mid CBD    P:  - [email or web address]  - ERCP w GI tomorrow  - ancef/flagyl  - follow t fito [email or web address]  - pain control   - SQH  - incentive spirometry  - encourage ambulation

## 2023-01-21 ENCOUNTER — ANESTHESIA (INPATIENT)
Dept: GASTROENTEROLOGY | Facility: HOSPITAL | Age: 38
End: 2023-01-21

## 2023-01-21 ENCOUNTER — APPOINTMENT (INPATIENT)
Dept: RADIOLOGY | Facility: HOSPITAL | Age: 38
End: 2023-01-21

## 2023-01-21 ENCOUNTER — ANESTHESIA EVENT (INPATIENT)
Dept: GASTROENTEROLOGY | Facility: HOSPITAL | Age: 38
End: 2023-01-21

## 2023-01-21 ENCOUNTER — APPOINTMENT (OUTPATIENT)
Dept: GASTROENTEROLOGY | Facility: HOSPITAL | Age: 38
End: 2023-01-21
Attending: INTERNAL MEDICINE

## 2023-01-21 LAB
ALBUMIN SERPL BCP-MCNC: 3.4 G/DL (ref 3.5–5)
ALP SERPL-CCNC: 110 U/L (ref 34–104)
ALT SERPL W P-5'-P-CCNC: 251 U/L (ref 7–52)
ANION GAP SERPL CALCULATED.3IONS-SCNC: 7 MMOL/L (ref 4–13)
AST SERPL W P-5'-P-CCNC: 142 U/L (ref 13–39)
BILIRUB SERPL-MCNC: 1.19 MG/DL (ref 0.2–1)
BUN SERPL-MCNC: 7 MG/DL (ref 5–25)
CALCIUM ALBUM COR SERPL-MCNC: 9.2 MG/DL (ref 8.3–10.1)
CALCIUM SERPL-MCNC: 8.7 MG/DL (ref 8.4–10.2)
CHLORIDE SERPL-SCNC: 102 MMOL/L (ref 96–108)
CO2 SERPL-SCNC: 27 MMOL/L (ref 21–32)
CREAT SERPL-MCNC: 0.55 MG/DL (ref 0.6–1.3)
ERYTHROCYTE [DISTWIDTH] IN BLOOD BY AUTOMATED COUNT: 15.9 % (ref 11.6–15.1)
GFR SERPL CREATININE-BSD FRML MDRD: 120 ML/MIN/1.73SQ M
GLUCOSE SERPL-MCNC: 97 MG/DL (ref 65–140)
HCT VFR BLD AUTO: 30.3 % (ref 34.8–46.1)
HGB BLD-MCNC: 9.5 G/DL (ref 11.5–15.4)
MCH RBC QN AUTO: 26 PG (ref 26.8–34.3)
MCHC RBC AUTO-ENTMCNC: 31.4 G/DL (ref 31.4–37.4)
MCV RBC AUTO: 83 FL (ref 82–98)
PLATELET # BLD AUTO: 193 THOUSANDS/UL (ref 149–390)
PMV BLD AUTO: 11.6 FL (ref 8.9–12.7)
POTASSIUM SERPL-SCNC: 3.7 MMOL/L (ref 3.5–5.3)
PROT SERPL-MCNC: 6.1 G/DL (ref 6.4–8.4)
RBC # BLD AUTO: 3.65 MILLION/UL (ref 3.81–5.12)
SODIUM SERPL-SCNC: 136 MMOL/L (ref 135–147)
WBC # BLD AUTO: 7.35 THOUSAND/UL (ref 4.31–10.16)

## 2023-01-21 PROCEDURE — 0FC98ZZ EXTIRPATION OF MATTER FROM COMMON BILE DUCT, VIA NATURAL OR ARTIFICIAL OPENING ENDOSCOPIC: ICD-10-PCS | Performed by: INTERNAL MEDICINE

## 2023-01-21 PROCEDURE — BF101ZZ FLUOROSCOPY OF BILE DUCTS USING LOW OSMOLAR CONTRAST: ICD-10-PCS | Performed by: INTERNAL MEDICINE

## 2023-01-21 RX ORDER — ROCURONIUM BROMIDE 10 MG/ML
INJECTION, SOLUTION INTRAVENOUS AS NEEDED
Status: DISCONTINUED | OUTPATIENT
Start: 2023-01-21 | End: 2023-01-21

## 2023-01-21 RX ORDER — PROPOFOL 10 MG/ML
INJECTION, EMULSION INTRAVENOUS AS NEEDED
Status: DISCONTINUED | OUTPATIENT
Start: 2023-01-21 | End: 2023-01-21

## 2023-01-21 RX ORDER — LIDOCAINE HYDROCHLORIDE 10 MG/ML
INJECTION, SOLUTION EPIDURAL; INFILTRATION; INTRACAUDAL; PERINEURAL AS NEEDED
Status: DISCONTINUED | OUTPATIENT
Start: 2023-01-21 | End: 2023-01-21

## 2023-01-21 RX ORDER — FENTANYL CITRATE 50 UG/ML
INJECTION, SOLUTION INTRAMUSCULAR; INTRAVENOUS AS NEEDED
Status: DISCONTINUED | OUTPATIENT
Start: 2023-01-21 | End: 2023-01-21

## 2023-01-21 RX ORDER — SIMETHICONE 80 MG
80 TABLET,CHEWABLE ORAL EVERY 6 HOURS PRN
Status: DISCONTINUED | OUTPATIENT
Start: 2023-01-21 | End: 2023-01-23 | Stop reason: HOSPADM

## 2023-01-21 RX ORDER — PROPOFOL 10 MG/ML
INJECTION, EMULSION INTRAVENOUS CONTINUOUS PRN
Status: DISCONTINUED | OUTPATIENT
Start: 2023-01-21 | End: 2023-01-21

## 2023-01-21 RX ORDER — ONDANSETRON 2 MG/ML
4 INJECTION INTRAMUSCULAR; INTRAVENOUS ONCE AS NEEDED
Status: DISCONTINUED | OUTPATIENT
Start: 2023-01-21 | End: 2023-01-23 | Stop reason: HOSPADM

## 2023-01-21 RX ORDER — GLYCOPYRROLATE 0.2 MG/ML
INJECTION INTRAMUSCULAR; INTRAVENOUS AS NEEDED
Status: DISCONTINUED | OUTPATIENT
Start: 2023-01-21 | End: 2023-01-21

## 2023-01-21 RX ORDER — FENTANYL CITRATE/PF 50 MCG/ML
25 SYRINGE (ML) INJECTION
Status: DISCONTINUED | OUTPATIENT
Start: 2023-01-21 | End: 2023-01-23 | Stop reason: HOSPADM

## 2023-01-21 RX ORDER — POTASSIUM CHLORIDE 20 MEQ/1
40 TABLET, EXTENDED RELEASE ORAL ONCE
Status: COMPLETED | OUTPATIENT
Start: 2023-01-21 | End: 2023-01-21

## 2023-01-21 RX ORDER — NEOSTIGMINE METHYLSULFATE 1 MG/ML
INJECTION INTRAVENOUS AS NEEDED
Status: DISCONTINUED | OUTPATIENT
Start: 2023-01-21 | End: 2023-01-21

## 2023-01-21 RX ORDER — DEXAMETHASONE SODIUM PHOSPHATE 10 MG/ML
INJECTION, SOLUTION INTRAMUSCULAR; INTRAVENOUS AS NEEDED
Status: DISCONTINUED | OUTPATIENT
Start: 2023-01-21 | End: 2023-01-21

## 2023-01-21 RX ORDER — ONDANSETRON 2 MG/ML
INJECTION INTRAMUSCULAR; INTRAVENOUS AS NEEDED
Status: DISCONTINUED | OUTPATIENT
Start: 2023-01-21 | End: 2023-01-21

## 2023-01-21 RX ORDER — SODIUM CHLORIDE, SODIUM LACTATE, POTASSIUM CHLORIDE, CALCIUM CHLORIDE 600; 310; 30; 20 MG/100ML; MG/100ML; MG/100ML; MG/100ML
225 INJECTION, SOLUTION INTRAVENOUS CONTINUOUS
Status: CANCELLED | OUTPATIENT
Start: 2023-01-21

## 2023-01-21 RX ADMIN — SUGAMMADEX 200 MG: 100 INJECTION, SOLUTION INTRAVENOUS at 11:50

## 2023-01-21 RX ADMIN — OXYCODONE HYDROCHLORIDE 5 MG: 5 TABLET ORAL at 19:22

## 2023-01-21 RX ADMIN — PROPOFOL 200 MG: 10 INJECTION, EMULSION INTRAVENOUS at 11:07

## 2023-01-21 RX ADMIN — FENTANYL CITRATE 100 MCG: 50 INJECTION, SOLUTION INTRAMUSCULAR; INTRAVENOUS at 11:07

## 2023-01-21 RX ADMIN — HEPARIN SODIUM 5000 UNITS: 5000 INJECTION INTRAVENOUS; SUBCUTANEOUS at 08:28

## 2023-01-21 RX ADMIN — METRONIDAZOLE 500 MG: 5 INJECTION, SOLUTION INTRAVENOUS at 04:32

## 2023-01-21 RX ADMIN — IOHEXOL 5 ML: 240 INJECTION, SOLUTION INTRATHECAL; INTRAVASCULAR; INTRAVENOUS; ORAL at 11:40

## 2023-01-21 RX ADMIN — HYDROMORPHONE HYDROCHLORIDE 0.5 MG: 1 INJECTION, SOLUTION INTRAMUSCULAR; INTRAVENOUS; SUBCUTANEOUS at 04:30

## 2023-01-21 RX ADMIN — POTASSIUM CHLORIDE 40 MEQ: 1500 TABLET, EXTENDED RELEASE ORAL at 08:28

## 2023-01-21 RX ADMIN — GLYCOPYRROLATE 0.4 MCG: 0.2 INJECTION, SOLUTION INTRAMUSCULAR; INTRAVENOUS at 11:40

## 2023-01-21 RX ADMIN — ONDANSETRON 4 MG: 2 INJECTION INTRAMUSCULAR; INTRAVENOUS at 10:01

## 2023-01-21 RX ADMIN — CEFAZOLIN SODIUM 1000 MG: 1 SOLUTION INTRAVENOUS at 06:31

## 2023-01-21 RX ADMIN — METRONIDAZOLE 500 MG: 5 INJECTION, SOLUTION INTRAVENOUS at 13:29

## 2023-01-21 RX ADMIN — SODIUM CHLORIDE, SODIUM LACTATE, POTASSIUM CHLORIDE, AND CALCIUM CHLORIDE 125 ML/HR: .6; .31; .03; .02 INJECTION, SOLUTION INTRAVENOUS at 00:45

## 2023-01-21 RX ADMIN — METRONIDAZOLE 500 MG: 5 INJECTION, SOLUTION INTRAVENOUS at 20:54

## 2023-01-21 RX ADMIN — ONDANSETRON 4 MG: 2 INJECTION INTRAMUSCULAR; INTRAVENOUS at 11:23

## 2023-01-21 RX ADMIN — ONDANSETRON 4 MG: 2 INJECTION INTRAMUSCULAR; INTRAVENOUS at 06:23

## 2023-01-21 RX ADMIN — ROCURONIUM BROMIDE 50 MG: 10 SOLUTION INTRAVENOUS at 11:07

## 2023-01-21 RX ADMIN — DEXAMETHASONE SODIUM PHOSPHATE 10 MG: 10 INJECTION, SOLUTION INTRAMUSCULAR; INTRAVENOUS at 11:23

## 2023-01-21 RX ADMIN — NEOSTIGMINE METHYLSULFATE 5 MG: 1 INJECTION INTRAVENOUS at 11:40

## 2023-01-21 RX ADMIN — INDOMETHACIN 100 MG: 50 SUPPOSITORY RECTAL at 11:19

## 2023-01-21 RX ADMIN — HYDROMORPHONE HYDROCHLORIDE 0.5 MG: 1 INJECTION, SOLUTION INTRAMUSCULAR; INTRAVENOUS; SUBCUTANEOUS at 20:52

## 2023-01-21 RX ADMIN — HYDROMORPHONE HYDROCHLORIDE 0.5 MG: 1 INJECTION, SOLUTION INTRAMUSCULAR; INTRAVENOUS; SUBCUTANEOUS at 06:19

## 2023-01-21 RX ADMIN — LIDOCAINE HYDROCHLORIDE 50 MG: 10 INJECTION, SOLUTION EPIDURAL; INFILTRATION; INTRACAUDAL at 11:07

## 2023-01-21 RX ADMIN — HEPARIN SODIUM 5000 UNITS: 5000 INJECTION INTRAVENOUS; SUBCUTANEOUS at 20:54

## 2023-01-21 RX ADMIN — HEPARIN SODIUM 5000 UNITS: 5000 INJECTION INTRAVENOUS; SUBCUTANEOUS at 13:28

## 2023-01-21 RX ADMIN — CEFAZOLIN SODIUM 1000 MG: 1 SOLUTION INTRAVENOUS at 21:29

## 2023-01-21 RX ADMIN — CEFAZOLIN SODIUM 1000 MG: 1 SOLUTION INTRAVENOUS at 12:43

## 2023-01-21 NOTE — UTILIZATION REVIEW
Continued Stay Review    Date:  1/21/2023                       Current Patient Class: inpatient     Current Level of Care: med surg     HPI:37 y o  female initially Observation 1/18 2051 changed to inpatient 1/20 1420  Continued stay for fresh post ofp after laparoscopic cholecystectomy with intraoperative cholangiogram  Pt requiring continued IV fluids and antibiotics, ERCP    Assessment/Plan:   GEN Surgery: POD1 s/p lap choley with IOC which confirmed stone in CBD  Plan for ERCP today  Cont NPO, IVF, IV pain meds & anti emetics  Cont IV ancef, flagyl;   ABD soft appropr tender; Patient endorses nausea and abdominal pain  Urinating without difficulty  No bowel function  Vital Signs:   Date/Time Temp Pulse Resp BP MAP (mmHg) SpO2 O2 Device Cardiac (WDL) Patient Position - Orthostatic VS   01/21/23 1009 97 7 °F (36 5 °C) 66 18 124/59 -- 99 % None (Room air) -- --   01/21/23 08:21:20 98 °F (36 7 °C) 87 18 118/62 81 -- -- -- --   01/20/23 22:21:04 98 6 °F (37 °C) -- -- 117/69 85 -- -- -- --   01/20/23 14:49:09 99 3 °F (37 4 °C) 70 -- 124/73 90 96 % -- -- --   01/20/23 14:48:56 99 3 °F (37 4 °C) -- -- 124/73 90 -- -- -- --     Pertinent Labs/Diagnostic Results:   1/21/2023 ERCP=  FINDINGS:  • The common bile duct was deeply cannulated after 1 attempt using a traction sphincterotome with guidewire  Cannulation was not difficult and no bleeding was observed  Contrast was injected  Cholangiogram showed common bile duct measuring about 6 to 7 mm with tapering down distally  Small filling defect in the distal bile duct noted    Small sphincterotomy was performed and the duct was swept with balloon catheter with extraction of a small stone    RECOMMENDATION:    observe; clear liquid diet today; lactated Ringer 225 mill per hour       Results from last 7 days   Lab Units 01/18/23  1547   SARS-COV-2  Negative     Results from last 7 days   Lab Units 01/21/23  0558 01/20/23  0636 01/19/23  0502 01/18/23  1547   WBC Thousand/uL 7 35 5 62 5 31 4 65   HEMOGLOBIN g/dL 9 5* 10 5* 10 1* 11 8   HEMATOCRIT % 30 3* 32 7* 31 7* 37 0   PLATELETS Thousands/uL 193 199 188 219   NEUTROS ABS Thousands/µL  --  3 37  --  2 82         Results from last 7 days   Lab Units 01/21/23  0558 01/20/23  0444 01/19/23  0502 01/18/23  1547   SODIUM mmol/L 136 136 137 137   POTASSIUM mmol/L 3 7 3 8 3 6 4 0   CHLORIDE mmol/L 102 107 107 104   CO2 mmol/L 27 21 21 25   ANION GAP mmol/L 7 8 9 8   BUN mg/dL 7 8 7 8   CREATININE mg/dL 0 55* 0 59* 0 51* 0 60   EGFR ml/min/1 73sq m 120 117 123 116   CALCIUM mg/dL 8 7 8 5 8 5 9 8   MAGNESIUM mg/dL  --   --  1 9  --    PHOSPHORUS mg/dL  --   --  2 7  --      Results from last 7 days   Lab Units 01/21/23  0558 01/20/23  0444 01/19/23  0502 01/18/23  1547   AST U/L 142* 105* 251* 554*   ALT U/L 251* 269* 400* 632*   ALK PHOS U/L 110* 115* 128* 148*   TOTAL PROTEIN g/dL 6 1* 6 2* 6 3* 7 9   ALBUMIN g/dL 3 4* 3 4* 3 6 4 4   TOTAL BILIRUBIN mg/dL 1 19* 1 02* 2 86* 2 48*   BILIRUBIN DIRECT mg/dL  --   --  1 89*  --          Results from last 7 days   Lab Units 01/21/23  0558 01/20/23  0444 01/19/23  0502 01/18/23  1547   GLUCOSE RANDOM mg/dL 97 75 94 95             No results found for: BETA-HYDROXYBUTYRATE                                                               Results from last 7 days   Lab Units 01/18/23  1547   LIPASE u/L 30                 Results from last 7 days   Lab Units 01/18/23  1555   CLARITY UA  Extra Turbid   COLOR UA  Yellow   SPEC GRAV UA  1 016   PH UA  8 5*   GLUCOSE UA mg/dl Negative   KETONES UA mg/dl Negative   BLOOD UA  Negative   PROTEIN UA mg/dl 30 (1+)*   NITRITE UA  Negative   BILIRUBIN UA  Small*   UROBILINOGEN UA (BE) mg/dl <2 0   LEUKOCYTES UA  Negative   WBC UA /hpf None Seen   RBC UA /hpf None Seen   BACTERIA UA /hpf None Seen   EPITHELIAL CELLS WET PREP /hpf Occasional     Results from last 7 days   Lab Units 01/18/23  1547   INFLUENZA A PCR  Negative   INFLUENZA B PCR  Negative RSV PCR  Negative     Medications:   Scheduled Medications:  cefazolin, 1,000 mg, Intravenous, Q8H  heparin (porcine), 5,000 Units, Subcutaneous, Q8H LUZ ELENA  metroNIDAZOLE, 500 mg, Intravenous, Q8H    Continuous IV Infusions:  lactated ringers, 125 mL/hr, Intravenous, Continuous      PRN Meds:  HYDROmorphone, 0 5 mg, Intravenous, Q1H PRN  indomethacin, , Rectal, PRN  ondansetron, 4 mg, Intravenous, Q6H PRN  oxyCODONE, 5 mg, Oral, Q4H PRN  simethicone, 80 mg, Oral, Q6H PRN    Discharge Plan: D  Network Utilization Review Department  ATTENTION: Please call with any questions or concerns to [phone] and carefully listen to the prompts so that you are directed to the right person  All voicemails are confidential   Chas Rodriguez all requests for admission clinical reviews, approved or denied determinations and any other requests to dedicated fax number below belonging to the campus where the patient is receiving treatment   List of dedicated fax numbers for the Facilities:  [address] DENIALS (Administrative/Medical Necessity) [phone]   [address] (Maternity/NICU/Pediatrics) [phone]   1 Adeline Cifuentes [phone]   [facility] [place]  [phone]   [address] [place] [address] [address] [phone]   1557 [facility] 134 815 [facility] [phone]

## 2023-01-21 NOTE — PROGRESS NOTES
Progress Note - General Surgery   KIARA Resident on Surgery Service   Aniket Claros 40 y o  female MRN: [id number]  Unit/Bed#: S -01 Encounter: [id number]    Assessment:  46yo F with symptomatic cholelithiasis and choledocholithiasis now POD1 s/p lap choley with IOC which confirmed stone in CBD  Plan for ERCP today  Afebrile  VSS  No I and Os  AM labs still pending    Plan:  -[email or web address]  -PRN pain meds  -PRN anti-nausea  -Continue ancef/flagyl  -DVT ppx  -Plan for ERCP today  -Incentive spirometry    Subjective/Objective     Subjective: NAEO  Patient endorses nausea and abdominal pain  Urinating without difficulty  No bowel function  Objective:     Blood pressure 117/69, pulse 70, temperature 98 6 °F (37 °C), resp  rate 12, last menstrual period 12/26/2022, SpO2 96 %  ,There is no height or weight on file to calculate BMI  Intake/Output Summary (Last 24 hours) at 1/21/2023 0749  Last data filed at 1/20/2023 1128  Gross per 24 hour   Intake 1200 ml   Output --   Net 1200 ml       Invasive Devices     Peripheral Intravenous Line  Duration           Peripheral IV 01/18/23 Right Antecubital 2 days                General: NAD  HENT: NCAT MMM  Neck: supple, no JVD  CV: nl rate  Lungs: nl wob   No resp distress  ABD: Soft, appropriately tender, nondistended  Extrem: No CCE  Neuro: AAOx3       Scheduled Meds:  Current Facility-Administered Medications   Medication Dose Route Frequency Provider Last Rate   • cefazolin  1,000 mg Intravenous Q8H Augie Hou DO 1,000 mg (01/21/23 0631)   • heparin (porcine)  5,000 Units Subcutaneous [facility] Augie Hou DO     • HYDROmorphone  0 5 mg Intravenous Q1H PRN Augie Hou DO     • lactated ringers  125 mL/hr Intravenous Continuous Ana Luisa Kaplan  mL/hr (01/21/23 0045)   • metroNIDAZOLE  500 mg Intravenous Q8H Augie Hou  mg (01/21/23 0432)   • ondansetron  4 mg Intravenous Q6H PRN Mickey Jack DO Ameya     • oxyCODONE  5 mg Oral Q4H PRN Augie Hou DO     • potassium chloride  40 mEq Oral Once Kiana Duarte MD       Continuous Infusions:lactated ringers, 125 mL/hr, Last Rate: 125 mL/hr (01/21/23 0045)      PRN Meds: •  HYDROmorphone  •  ondansetron  •  oxyCODONE      Lab, Imaging and other studies:  I have personally reviewed pertinent lab results    , CBC:   Lab Results   Component Value Date    WBC 7 35 01/21/2023    HGB 9 5 (L) 01/21/2023    HCT 30 3 (L) 01/21/2023    MCV 83 01/21/2023     01/21/2023    MCH 26 0 (L) 01/21/2023    MCHC 31 4 01/21/2023    RDW 15 9 (H) 01/21/2023    MPV 11 6 01/21/2023   , CMP:   Lab Results   Component Value Date    SODIUM 136 01/21/2023    K 3 7 01/21/2023     01/21/2023    CO2 27 01/21/2023    BUN 7 01/21/2023    CREATININE 0 55 (L) 01/21/2023    CALCIUM 8 7 01/21/2023     (H) 01/21/2023     (H) 01/21/2023    ALKPHOS 110 (H) 01/21/2023    EGFR 120 01/21/2023   , Coagulation: No results found for: PT, INR, APTT, Urinalysis: No results found for: COLORU, CLARITYU, SPECGRAV, PHUR, LEUKOCYTESUR, NITRITE, PROTEINUA, GLUCOSEU, KETONESU, BILIRUBINUR, BLOODU, Amylase: No results found for: AMYLASE, Lipase: No results found for: LIPASE  VTE Pharmacologic Prophylaxis: Heparin  VTE Mechanical Prophylaxis: sequential compression device      Kourtney Aburto MD  1/21/2023 7:49 AM

## 2023-01-21 NOTE — ANESTHESIA PREPROCEDURE EVALUATION
Procedure:  ERCP    Relevant Problems   MUSCULOSKELETAL   (+) Chronic bilateral low back pain without sciatica      NEURO/PSYCH   (+) Chronic bilateral low back pain without sciatica   (+) Tension headache      Digestive   (+) Acute calculous cholecystitis   (+) Choledocholithiasis      Other   (+) Obesity (BMI 30-39  9)      Lab Results   Component Value Date    SODIUM 136 01/21/2023    K 3 7 01/21/2023     01/21/2023    CO2 27 01/21/2023    AGAP 7 01/21/2023    BUN 7 01/21/2023    CREATININE 0 55 (L) 01/21/2023    GLUC 97 01/21/2023    GLUF 75 01/20/2023    CALCIUM 8 7 01/21/2023     (H) 01/21/2023     (H) 01/21/2023    ALKPHOS 110 (H) 01/21/2023    TP 6 1 (L) 01/21/2023    TBILI 1 19 (H) 01/21/2023    EGFR 120 01/21/2023     Lab Results   Component Value Date    WBC 7 35 01/21/2023    HGB 9 5 (L) 01/21/2023    HCT 30 3 (L) 01/21/2023    MCV 83 01/21/2023     01/21/2023       Physical Exam    Airway    Mallampati score: II  TM Distance: >3 FB  Neck ROM: full     Dental   No notable dental hx     Cardiovascular      Pulmonary      Other Findings        Anesthesia Plan  ASA Score- 2     Anesthesia Type- general with ASA Monitors  Additional Monitors:   Airway Plan: ETT  Plan Factors-Exercise tolerance (METS): >4 METS  Chart reviewed  EKG reviewed  Imaging results reviewed  Existing labs reviewed  Patient summary reviewed  Induction- intravenous  Postoperative Plan- Plan for postoperative opioid use  Informed Consent- Anesthetic plan and risks discussed with patient  I personally reviewed this patient with the CRNA  Discussed and agreed on the Anesthesia Plan with the CRNA  Janneth Fish

## 2023-01-22 LAB
ALBUMIN SERPL BCP-MCNC: 3.8 G/DL (ref 3.5–5)
ALP SERPL-CCNC: 136 U/L (ref 34–104)
ALT SERPL W P-5'-P-CCNC: 314 U/L (ref 7–52)
ANION GAP SERPL CALCULATED.3IONS-SCNC: 6 MMOL/L (ref 4–13)
AST SERPL W P-5'-P-CCNC: 242 U/L (ref 13–39)
BILIRUB SERPL-MCNC: 2.2 MG/DL (ref 0.2–1)
BUN SERPL-MCNC: 9 MG/DL (ref 5–25)
CALCIUM SERPL-MCNC: 8.9 MG/DL (ref 8.4–10.2)
CHLORIDE SERPL-SCNC: 104 MMOL/L (ref 96–108)
CO2 SERPL-SCNC: 27 MMOL/L (ref 21–32)
CREAT SERPL-MCNC: 0.61 MG/DL (ref 0.6–1.3)
ERYTHROCYTE [DISTWIDTH] IN BLOOD BY AUTOMATED COUNT: 15.9 % (ref 11.6–15.1)
GFR SERPL CREATININE-BSD FRML MDRD: 116 ML/MIN/1.73SQ M
GLUCOSE SERPL-MCNC: 107 MG/DL (ref 65–140)
HCT VFR BLD AUTO: 31.4 % (ref 34.8–46.1)
HGB BLD-MCNC: 10.2 G/DL (ref 11.5–15.4)
LIPASE SERPL-CCNC: 81 U/L (ref 11–82)
MCH RBC QN AUTO: 27.1 PG (ref 26.8–34.3)
MCHC RBC AUTO-ENTMCNC: 32.5 G/DL (ref 31.4–37.4)
MCV RBC AUTO: 84 FL (ref 82–98)
PLATELET # BLD AUTO: 218 THOUSANDS/UL (ref 149–390)
PMV BLD AUTO: 11.6 FL (ref 8.9–12.7)
POTASSIUM SERPL-SCNC: 3.7 MMOL/L (ref 3.5–5.3)
PROT SERPL-MCNC: 6.6 G/DL (ref 6.4–8.4)
RBC # BLD AUTO: 3.76 MILLION/UL (ref 3.81–5.12)
SODIUM SERPL-SCNC: 137 MMOL/L (ref 135–147)
WBC # BLD AUTO: 6.52 THOUSAND/UL (ref 4.31–10.16)

## 2023-01-22 RX ORDER — METOCLOPRAMIDE HYDROCHLORIDE 5 MG/ML
10 INJECTION INTRAMUSCULAR; INTRAVENOUS EVERY 6 HOURS PRN
Status: DISCONTINUED | OUTPATIENT
Start: 2023-01-22 | End: 2023-01-23 | Stop reason: HOSPADM

## 2023-01-22 RX ORDER — DEXTROSE, SODIUM CHLORIDE, AND POTASSIUM CHLORIDE 5; .45; .15 G/100ML; G/100ML; G/100ML
75 INJECTION INTRAVENOUS CONTINUOUS
Status: DISCONTINUED | OUTPATIENT
Start: 2023-01-22 | End: 2023-01-23 | Stop reason: HOSPADM

## 2023-01-22 RX ORDER — SODIUM CHLORIDE, SODIUM LACTATE, POTASSIUM CHLORIDE, CALCIUM CHLORIDE 600; 310; 30; 20 MG/100ML; MG/100ML; MG/100ML; MG/100ML
125 INJECTION, SOLUTION INTRAVENOUS CONTINUOUS
Status: DISCONTINUED | OUTPATIENT
Start: 2023-01-22 | End: 2023-01-22

## 2023-01-22 RX ADMIN — HEPARIN SODIUM 5000 UNITS: 5000 INJECTION INTRAVENOUS; SUBCUTANEOUS at 21:27

## 2023-01-22 RX ADMIN — METRONIDAZOLE 500 MG: 5 INJECTION, SOLUTION INTRAVENOUS at 22:43

## 2023-01-22 RX ADMIN — ONDANSETRON 4 MG: 2 INJECTION INTRAMUSCULAR; INTRAVENOUS at 05:49

## 2023-01-22 RX ADMIN — CEFAZOLIN SODIUM 1000 MG: 1 SOLUTION INTRAVENOUS at 04:50

## 2023-01-22 RX ADMIN — OXYCODONE HYDROCHLORIDE 5 MG: 5 TABLET ORAL at 04:51

## 2023-01-22 RX ADMIN — METRONIDAZOLE 500 MG: 5 INJECTION, SOLUTION INTRAVENOUS at 15:17

## 2023-01-22 RX ADMIN — CEFAZOLIN SODIUM 1000 MG: 1 SOLUTION INTRAVENOUS at 13:58

## 2023-01-22 RX ADMIN — HYDROMORPHONE HYDROCHLORIDE 0.5 MG: 1 INJECTION, SOLUTION INTRAMUSCULAR; INTRAVENOUS; SUBCUTANEOUS at 05:46

## 2023-01-22 RX ADMIN — METOCLOPRAMIDE 10 MG: 5 INJECTION, SOLUTION INTRAMUSCULAR; INTRAVENOUS at 08:11

## 2023-01-22 RX ADMIN — DEXTROSE, SODIUM CHLORIDE, AND POTASSIUM CHLORIDE 75 ML/HR: 5; .45; .15 INJECTION INTRAVENOUS at 23:45

## 2023-01-22 RX ADMIN — HEPARIN SODIUM 5000 UNITS: 5000 INJECTION INTRAVENOUS; SUBCUTANEOUS at 04:52

## 2023-01-22 RX ADMIN — SIMETHICONE 80 MG: 80 TABLET, CHEWABLE ORAL at 14:19

## 2023-01-22 RX ADMIN — METRONIDAZOLE 500 MG: 5 INJECTION, SOLUTION INTRAVENOUS at 04:50

## 2023-01-22 RX ADMIN — HEPARIN SODIUM 5000 UNITS: 5000 INJECTION INTRAVENOUS; SUBCUTANEOUS at 14:00

## 2023-01-22 RX ADMIN — CEFAZOLIN SODIUM 1000 MG: 1 SOLUTION INTRAVENOUS at 21:27

## 2023-01-22 RX ADMIN — SODIUM CHLORIDE, SODIUM LACTATE, POTASSIUM CHLORIDE, AND CALCIUM CHLORIDE 125 ML/HR: .6; .31; .03; .02 INJECTION, SOLUTION INTRAVENOUS at 07:50

## 2023-01-22 NOTE — PROGRESS NOTES
Progress Note - Angelina Saab 40 y o  female MRN: [id number]    Unit/Bed#: S -01 Encounter: [id number]    Assessment and Plan:     1  Choledocholithiasis  ERCP performed yesterday with small filling defect in the distal CBD, small sphincterotomy was performed with duct swept with extraction of small stone  Mild increase in liver enzymes this morning with bilirubin of 2 2  White count normal   Patient reporting upper abdominal pain and lipase was obtained which was normal   Vital signs stable, afebrile     -Continue to monitor abdominal exam   -Lo fat diet as tolerated  -Continue fluids  -Repeat liver enzymes tomorrow to ensure downtrending   -Antibiotics per surgery team    ----------------------------------------------------------------------------------------------------------------    Subjective:     Patient reports abdominal pain that started this morning  She reports that in the upper abdomen  Eating breakfast this morning  Denies any bowel movements recently  Objective:     Vitals: Blood pressure 107/59, pulse 63, temperature 98 °F (36 7 °C), temperature source Oral, resp  rate 16, last menstrual period 12/26/2022, SpO2 98 %  ,There is no height or weight on file to calculate BMI  Intake/Output Summary (Last 24 hours) at 1/22/2023 1032  Last data filed at 1/21/2023 1801  Gross per 24 hour   Intake 410 ml   Output 3600 ml   Net -3190 ml       Physical Exam:     General Appearance: Sleeping prior to my arrival   Does not appear in distress  Lungs: Clear to auscultation bilaterally, no rales or rhonchi, no labored breathing/accessory muscle use  Heart: Regular rate and rhythm, S1, S2 normal, no murmur, click, rub or gallop  Abdomen: + Patient reports some tenderness with the lower abdomen as well as the epigastric area  Soft  Nondistended   +BS  Extremities: No cyanosis, clubbing, or edema    Invasive Devices     Peripheral Intravenous Line  Duration           Peripheral IV 01/18/23 Right Antecubital 3 days                Lab Results:  Results from last 7 days   Lab Units 01/22/23  0457 01/21/23  0558 01/20/23  0636   WBC Thousand/uL 6 52   < > 5 62   HEMOGLOBIN g/dL 10 2*   < > 10 5*   HEMATOCRIT % 31 4*   < > 32 7*   PLATELETS Thousands/uL 218   < > 199   NEUTROS PCT %  --   --  59   LYMPHS PCT %  --   --  29   MONOS PCT %  --   --  8   EOS PCT %  --   --  3    < > = values in this interval not displayed  Results from last 7 days   Lab Units 01/22/23  0457   POTASSIUM mmol/L 3 7   CHLORIDE mmol/L 104   CO2 mmol/L 27   BUN mg/dL 9   CREATININE mg/dL 0 61   CALCIUM mg/dL 8 9   ALK PHOS U/L 136*   ALT U/L 314*   AST U/L 242*     Invalid input(s): BILI      Results from last 7 days   Lab Units 01/22/23  0457   LIPASE u/L 81       Imaging Studies: I have personally reviewed pertinent imaging studies  ERCP    Result Date: 1/21/2023  Impression: As above RECOMMENDATION:  observe; clear liquid diet today; lactated Ringer 225 mill per hour  Naz Britton MD     XR cholangiogram intraoperative    Result Date: 1/20/2023  Impression: Evidence of partially obstructing choledocholithiasis  Please see procedure report for further details  Workstation performed: [id number]     MRI abdomen wo contrast and mrcp    Result Date: 1/19/2023  Impression: 1  Slight caliber change of the common bile duct with hypoechogenicity at the transition likely representing a small stone, particularly given small stones in the gallbladder and elevated bilirubin  2   Cholelithiasis with pericholecystic fluid, not present on the prior ultrasound  This is suspicious for acute cholecystitis and clinical correlation is recommended  3   Tiny scattered pancreatic cysts not definitely communicating with the nondilated pancreatic duct  These are nonspecific an follow-up is recommended  For simple cyst(s) less than 1 5 cm, recommend yearly followup 5 times, then every 2 year for 2 times   If cyst(s) stable after 9 years, no further followups  Recommend next followup in 1 year  Preferred imaging modality: abdomen MRI and MRCP with and without IV contrast, or triple phase abdomen CT with IV contrast, or abdomen MRI and MRCP without IV contrast  4   Mild hepatic steatosis  The recommendations regarding pancreatic findings assumes that patient does not have family history of pancreatic cancer nor have any symptoms potentially attributable to pancreatic cystic lesions (hyperamylasemia, recent-onset diabetes, severe epigastric pain, weight loss, steatorrhea, or jaundice ) If these conditions are not true, then management should be deferred to judgement of specialists such as gastroenterologists or oncologic surgeons  Recommendations are based on recent consensus statements on management of pancreatic cystic lesions from [facility], [facility], the journal Pancreatology, and our own institutional consensus  The study was marked in [facility] for immediate notification  Workstation performed: [id number]     US right upper quadrant    Result Date: 1/18/2023  Impression: Cholelithiasis with no evidence of acute cholecystitis  No biliary dilatation    Otherwise, unremarkable right upper quadrant exam  Workstation performed: [id number]

## 2023-01-22 NOTE — PROGRESS NOTES
Progress Note - General Surgery   Amie Hooper 40 y o  female MRN: [id number]  Unit/Bed#: S -01 Encounter: [id number]    Assessment:  45yoF p/w symptomatic cholelithiasis and choledocholithiasis, now s/p laparoscopic cholecystectomy w IOC on 1/20, found to have stone in mid CBD, s/p ERCP w CBD cannulation/sphincterotomy, stone extraction w GI on 1/21    Vital stable, afebrile  WBC 6 52 from 7 35  Hemoglobin 10 2 from 9 5  Creatinine 0 61  T bili 2 2 from 1 19    UOP 3900    Plan:  - [email or web address]  - will send lipase   -GI on board, appreciate recommendations  - ancef/flagyl  - follow t [email or web address]  - pain control   - SQH  - incentive spirometry  - encourage ambulation    Subjective/Objective   Subjective:   Patient reports some continued abdominal pain mainly in the right upper quadrant, also reports some continued nausea without any episodes of emesis, states she also has some gassy pain in the abdomen, is eating less secondary to this nausea and not being that hungry, not yet passing flatus or having bowel movements following surgery, ambulating without issues, voiding  Objective:     Blood pressure 107/59, pulse 63, temperature 98 °F (36 7 °C), temperature source Oral, resp  rate 16, last menstrual period 12/26/2022, SpO2 98 %  ,There is no height or weight on file to calculate BMI        Intake/Output Summary (Last 24 hours) at 1/22/2023 0814  Last data filed at 1/21/2023 1801  Gross per 24 hour   Intake 1410 ml   Output 3900 ml   Net -2490 ml       Invasive Devices     Peripheral Intravenous Line  Duration           Peripheral IV 01/18/23 Right Antecubital 3 days                Physical Exam:   General: NAD  Skin: Warm, dry, anicteric  HEENT: Normocephalic, atraumatic  CV: RRR, no m/r/g  Pulm: CTA b/l, no inc WOB  Abd: Soft, ND, minimally tender in the RUQ, incisions clean dry and intact  MSK: Symmetric, no edema, no tenderness, no deformity  Neuro: AOx3, GCS 15    Lab, Imaging and other studies:  I have personally reviewed pertinent lab results    , CBC:   Lab Results   Component Value Date    WBC 6 52 01/22/2023    HGB 10 2 (L) 01/22/2023    HCT 31 4 (L) 01/22/2023    MCV 84 01/22/2023     01/22/2023    MCH 27 1 01/22/2023    MCHC 32 5 01/22/2023    RDW 15 9 (H) 01/22/2023    MPV 11 6 01/22/2023   , CMP:   Lab Results   Component Value Date    SODIUM 137 01/22/2023    K 3 7 01/22/2023     01/22/2023    CO2 27 01/22/2023    BUN 9 01/22/2023    CREATININE 0 61 01/22/2023    CALCIUM 8 9 01/22/2023     (H) 01/22/2023     (H) 01/22/2023    ALKPHOS 136 (H) 01/22/2023    EGFR 116 01/22/2023     VTE Pharmacologic Prophylaxis: Sequential compression device (Venodyne)  and Heparin  VTE Mechanical Prophylaxis: sequential compression device

## 2023-01-23 ENCOUNTER — TRANSITIONAL CARE MANAGEMENT (OUTPATIENT)
Dept: FAMILY MEDICINE CLINIC | Facility: CLINIC | Age: 38
End: 2023-01-23

## 2023-01-23 VITALS
DIASTOLIC BLOOD PRESSURE: 69 MMHG | SYSTOLIC BLOOD PRESSURE: 116 MMHG | OXYGEN SATURATION: 98 % | TEMPERATURE: 98.9 F | HEART RATE: 63 BPM | RESPIRATION RATE: 16 BRPM

## 2023-01-23 PROBLEM — K80.00 ACUTE CALCULOUS CHOLECYSTITIS: Status: RESOLVED | Noted: 2023-01-19 | Resolved: 2023-01-23

## 2023-01-23 PROBLEM — K80.50 CHOLEDOCHOLITHIASIS: Status: RESOLVED | Noted: 2023-01-20 | Resolved: 2023-01-23

## 2023-01-23 LAB
ALBUMIN SERPL BCP-MCNC: 3.5 G/DL (ref 3.5–5)
ALP SERPL-CCNC: 130 U/L (ref 34–104)
ALT SERPL W P-5'-P-CCNC: 294 U/L (ref 7–52)
ANION GAP SERPL CALCULATED.3IONS-SCNC: 7 MMOL/L (ref 4–13)
AST SERPL W P-5'-P-CCNC: 175 U/L (ref 13–39)
BILIRUB SERPL-MCNC: 0.92 MG/DL (ref 0.2–1)
BUN SERPL-MCNC: 8 MG/DL (ref 5–25)
CALCIUM SERPL-MCNC: 8.5 MG/DL (ref 8.4–10.2)
CHLORIDE SERPL-SCNC: 103 MMOL/L (ref 96–108)
CO2 SERPL-SCNC: 25 MMOL/L (ref 21–32)
CREAT SERPL-MCNC: 0.57 MG/DL (ref 0.6–1.3)
GFR SERPL CREATININE-BSD FRML MDRD: 118 ML/MIN/1.73SQ M
GLUCOSE SERPL-MCNC: 100 MG/DL (ref 65–140)
POTASSIUM SERPL-SCNC: 3.6 MMOL/L (ref 3.5–5.3)
PROT SERPL-MCNC: 6.3 G/DL (ref 6.4–8.4)
SODIUM SERPL-SCNC: 135 MMOL/L (ref 135–147)

## 2023-01-23 RX ORDER — OXYCODONE HYDROCHLORIDE 5 MG/1
5 TABLET ORAL EVERY 6 HOURS PRN
Qty: 12 TABLET | Refills: 0 | Status: SHIPPED | OUTPATIENT
Start: 2023-01-23 | End: 2023-02-02

## 2023-01-23 RX ADMIN — METRONIDAZOLE 500 MG: 5 INJECTION, SOLUTION INTRAVENOUS at 06:07

## 2023-01-23 RX ADMIN — CEFAZOLIN SODIUM 1000 MG: 1 SOLUTION INTRAVENOUS at 04:42

## 2023-01-23 RX ADMIN — HEPARIN SODIUM 5000 UNITS: 5000 INJECTION INTRAVENOUS; SUBCUTANEOUS at 06:08

## 2023-01-23 NOTE — UTILIZATION REVIEW
Initial Clinical Review    Observation 1/18 2051 changed to inpatient 1/20 1420  Pt requiring continued stay for fresh post of after laparoscopic cholecystectomy with intraoperative cholangiogram  Pt requiring continued IV fluids and antibiotics  Admission: Date/Time/Statement:   Admission Orders (From admission, onward)     Ordered        01/20/23 1420  Inpatient Admission  Once            01/18/23 2051  Place in Observation  Once                      Orders Placed This Encounter   Procedures   • Inpatient Admission     Standing Status:   Standing     Number of Occurrences:   1     Order Specific Question:   Level of Care     Answer:   Med Surg [16]     Order Specific Question:   Estimated length of stay     Answer:   More than 2 Midnights     Order Specific Question:   Certification     Answer:   I certify that inpatient services are medically necessary for this patient for a duration of greater than two midnights  See H&P and MD Progress Notes for additional information about the patient's course of treatment  ED Arrival Information     Expected   -    Arrival   1/18/2023 15:04    Acuity   Urgent            Means of arrival   Walk-In    Escorted by   Self    Service   Surgery-General    Admission type   Emergency            Arrival complaint   Abdominal Pain             Chief Complaint   Patient presents with   • Abdominal Pain     Patient c/o epigastric abdominal pain, with N/V  Initial Presentation: 40 y o  female w insignificant PMH presenting with 1 day of abdominal pain localized to the epigastric region and RUQ, associated nausea and emesis, found to have cholelithiasis on imaging without acute cholecystitis, also noted to have elevated total bilirubin   Patient reports onset of abdominal pain mainly localized to the epigastric region yesterday afternoon, she denies any inciting factors or traumatic events, states she had some noodles for lunch earlier in the day but this pain occurred well after she had eaten her meal, describes it as sharp in character  She reports associated nausea with multiple episodes of NB/NB emesis  She was able to eat some small amount of mashed potatoes for dinner last night but was not able to eat that much compared to her normal  She again woke up this morning around 4:30 AM with the same pain, associated nausea and episodes of NB/NB emesis  She has had decreased p o  intake today  Plan: Observation for cholelithiasis: MRCP, CMP in am, GI consult, will tentatively book for laparoscopic cholecystectomy with IOC for tomorrow 1/19, IV ancef/flagyl, NPO at midnight, IV fluids, pain control  1/19:    Surgery: NPO, MRCP pending, GI consult, tentatively booked for lap doris with IOC for today, TBD, IV fluids  Pain control  1/20 Observation changed to inpatient  Surgery: MRCP result from yesterday noted  However given dramatic improvement in her T bili, this is suggestive that the stone has passed      Procedure(s) (LRB):  CHOLECYSTECTOMY LAPAROSCOPIC W/ INTRAOP CHOLANGIOGRAM (N/A)    ED Triage Vitals   Temperature Pulse Respirations Blood Pressure SpO2   01/18/23 1544 01/18/23 1544 01/18/23 1544 01/18/23 1544 01/18/23 1544   98 4 °F (36 9 °C) 85 18 120/75 100 %      Temp Source Heart Rate Source Patient Position - Orthostatic VS BP Location FiO2 (%)   01/18/23 1544 01/18/23 1544 01/18/23 1544 01/18/23 1544 --   Oral Monitor Sitting Right arm       Pain Score       01/18/23 2115       4          Wt Readings from Last 1 Encounters:   03/02/22 83 kg (183 lb)     Additional Vital Signs:     Date/Time Temp Pulse Resp BP MAP (mmHg) SpO2 O2 Device   01/20/23 1226 98 °F (36 7 °C) 72 12 113/62 -- 94 % None (Room air)   01/20/23 1215 98 °F (36 7 °C) 72 17 113/62 81 96 % None (Room air)   01/20/23 1212 98 °F (36 7 °C) 74 13 119/63 -- 95 % None (Room air)   01/20/23 1205 98 °F (36 7 °C) 76 15 119/63 -- 97 % None (Room air)   01/20/23 1200 98 °F (36 7 °C) 68 16 119/63 87 97 % None (Room air)   01/20/23 1156 97 7 °F (36 5 °C) 74 19 127/65 -- 96 % None (Room air)   01/20/23 1145 97 7 °F (36 5 °C) 76 21 127/65 92 100 % None (Room air)   01/20/23 1143 97 7 °F (36 5 °C) 75 20 133/73 96 100 % None (Room air)   01/20/23 0940 98 3 °F (36 8 °C) 81 18 137/63 -- 100 % --   01/20/23 07:27:15 -- 73 -- 119/61 80 96 % --   01/19/23 21:14:03 99 4 °F (37 4 °C) 71 18 120/64 83 97 % None (Room air)   01/19/23 15:46:41 98 9 °F (37 2 °C) -- -- 120/63 82 -- --   01/19/23 0500 -- -- -- -- -- -- None (Room air)   01/18/23 23:18:35 98 7 °F (37 1 °C) -- -- 124/95 105 -- --   01/18/23 2230 -- 65 18 108/55 77 97 % None (Room air)   01/18/23 2215 -- 66 18 101/56 73 98 % None (Room air)   01/18/23 2200 -- 68 16 95/53 71 99 % None (Room air)   01/18/23 2115 -- 72 16 110/58 78 100 % None (Room air)   01/18/23 2015 -- 79 16 112/56 77 100 % None (Room air)   01/18/23 1930 99 °F (37 2 °C) 70 20 130/58 82 100 % None (Room air)     Pertinent Labs/Diagnostic Test Results:   XR cholangiogram intraoperative   Final Result by Tk Lobato MD (01/20 5674)      Evidence of partially obstructing choledocholithiasis  Please see procedure report for further details  Workstation performed: [id number]         MRI abdomen wo contrast and mrcp   Final Result by Tk Lobato MD (01/19 5898)      1  Slight caliber change of the common bile duct with hypoechogenicity at the transition likely representing a small stone, particularly given small stones in the gallbladder and elevated bilirubin  2   Cholelithiasis with pericholecystic fluid, not present on the prior ultrasound  This is suspicious for acute cholecystitis and clinical correlation is recommended  3   Tiny scattered pancreatic cysts not definitely communicating with the nondilated pancreatic duct  These are nonspecific an follow-up is recommended   For simple cyst(s) less than 1 5 cm, recommend yearly followup 5 times, then every 2 year for 2 times  If cyst(s) stable after 9 years, no further followups  Recommend next followup in 1 year  Preferred imaging modality: abdomen MRI and MRCP with and without IV contrast, or triple phase abdomen CT with IV contrast, or abdomen MRI and MRCP without    IV contrast       4   Mild hepatic steatosis  The recommendations regarding pancreatic findings assumes that patient does not have family history of pancreatic cancer nor have any symptoms potentially attributable to pancreatic cystic lesions (hyperamylasemia, recent-onset diabetes, severe    epigastric pain, weight loss, steatorrhea, or jaundice ) If these conditions are not true, then management should be deferred to judgement of specialists such as gastroenterologists or oncologic surgeons  Recommendations are based on recent consensus    statements on management of pancreatic cystic lesions from [address] [place] [address] [employer], [address] [facility], the journal Pancreatology, and our own institutional consensus  The study was marked in [facility] for immediate notification  Workstation performed: [id number]         US right upper quadrant   Final Result by Lennart Duverney, MD (01/18 1921)      Cholelithiasis with no evidence of acute cholecystitis  No biliary dilatation    Otherwise, unremarkable right upper quadrant exam       Workstation performed: [id number]         FL ERCP biliary only    (Results Pending)     Results from last 7 days   Lab Units 01/18/23  1547   SARS-COV-2  Negative     Results from last 7 days   Lab Units 01/22/23  0457 01/21/23  0558 01/20/23  0636 01/19/23  0502 01/18/23  1547   WBC Thousand/uL 6 52 7 35 5 62 5 31 4 65   HEMOGLOBIN g/dL 10 2* 9 5* 10 5* 10 1* 11 8   HEMATOCRIT % 31 4* 30 3* 32 7* 31 7* 37 0   PLATELETS Thousands/uL 218 193 199 188 219   NEUTROS ABS Thousands/µL  --   --  3 37  --  2 82         Results from last 7 days   Lab Units 01/23/23  0550 01/22/23  0457 01/21/23  0180 01/20/23  0444 01/19/23  0502   SODIUM mmol/L 135 137 136 136 137   POTASSIUM mmol/L 3 6 3 7 3 7 3 8 3 6   CHLORIDE mmol/L 103 104 102 107 107   CO2 mmol/L 25 27 27 21 21   ANION GAP mmol/L 7 6 7 8 9   BUN mg/dL 8 9 7 8 7   CREATININE mg/dL 0 57* 0 61 0 55* 0 59* 0 51*   EGFR ml/min/1 73sq m 118 116 120 117 123   CALCIUM mg/dL 8 5 8 9 8 7 8 5 8 5   MAGNESIUM mg/dL  --   --   --   --  1 9   PHOSPHORUS mg/dL  --   --   --   --  2 7     Results from last 7 days   Lab Units 01/23/23  0550 01/22/23  0457 01/21/23  0558 01/20/23  0444 01/19/23  0502   AST U/L 175* 242* 142* 105* 251*   ALT U/L 294* 314* 251* 269* 400*   ALK PHOS U/L 130* 136* 110* 115* 128*   TOTAL PROTEIN g/dL 6 3* 6 6 6 1* 6 2* 6 3*   ALBUMIN g/dL 3 5 3 8 3 4* 3 4* 3 6   TOTAL BILIRUBIN mg/dL 0 92 2 20* 1 19* 1 02* 2 86*   BILIRUBIN DIRECT mg/dL  --   --   --   --  1 89*         Results from last 7 days   Lab Units 01/23/23  0550 01/22/23  0457 01/21/23  0558 01/20/23  0444 01/19/23  0502 01/18/23  1547   GLUCOSE RANDOM mg/dL 100 107 97 75 94 95           Results from last 7 days   Lab Units 01/22/23  0457 01/18/23  1547   LIPASE u/L 81 30                 Results from last 7 days   Lab Units 01/18/23  1555   CLARITY UA  Extra Turbid   COLOR UA  Yellow   SPEC GRAV UA  1 016   PH UA  8 5*   GLUCOSE UA mg/dl Negative   KETONES UA mg/dl Negative   BLOOD UA  Negative   PROTEIN UA mg/dl 30 (1+)*   NITRITE UA  Negative   BILIRUBIN UA  Small*   UROBILINOGEN UA (BE) mg/dl <2 0   LEUKOCYTES UA  Negative   WBC UA /hpf None Seen   RBC UA /hpf None Seen   BACTERIA UA /hpf None Seen   EPITHELIAL CELLS WET PREP /hpf Occasional     Results from last 7 days   Lab Units 01/18/23  1547   INFLUENZA A PCR  Negative   INFLUENZA B PCR  Negative   RSV PCR  Negative       ED Treatment:   Medication Administration from 01/18/2023 1504 to 01/18/2023 2253       Date/Time Order Dose Route Action     01/18/2023 1925 EST sodium chloride 0 9 % bolus 1,000 mL 1,000 mL Intravenous New Bag     01/18/2023 1928 EST HYDROmorphone (DILAUDID) injection 0 5 mg 0 5 mg Intravenous Given     01/18/2023 1925 EST ondansetron (ZOFRAN) injection 4 mg 4 mg Intravenous Given     01/18/2023 2115 EST lactated ringers infusion 125 mL/hr Intravenous New Bag     01/18/2023 2120 EST ceFAZolin (ANCEF) IVPB (premix in dextrose) 1,000 mg 50 mL 1,000 mg Intravenous New Bag     01/18/2023 2149 EST metroNIDAZOLE (FLAGYL) IVPB (premix) 500 mg 100 mL 500 mg Intravenous New Bag        History reviewed  No pertinent past medical history  Present on Admission:  **None**      Admitting Diagnosis: Cholelithiasis [K80 20]  Hyperbilirubinemia [E80 6]  Abdominal pain [R10 9]  Transaminitis [R74 01]  Age/Sex: 40 y o  female  Admission Orders:  Scheduled Medications:  heparin (porcine), 5,000 Units, Subcutaneous, Q8H [facility]      Continuous IV Infusions:  dextrose 5 % and sodium chloride 0 45 % with KCl 20 mEq/L, 75 mL/hr, Intravenous, Continuous      PRN Meds:  HYDROmorphone, 0 5 mg, Intravenous, Q1H PRN  metoclopramide, 10 mg, Intravenous, Q6H PRN  ondansetron, 4 mg, Intravenous, Q6H PRN  oxyCODONE, 5 mg, Oral, Q4H PRN  simethicone, 80 mg, Oral, Q6H PRN        IP CONSULT TO GASTROENTEROLOGY    Network Utilization Review Department  ATTENTION: Please call with any questions or concerns to [phone] and carefully listen to the prompts so that you are directed to the right person  All voicemails are confidential   Joi Hernandez all requests for admission clinical reviews, approved or denied determinations and any other requests to dedicated fax number below belonging to the campus where the patient is receiving treatment   List of dedicated fax numbers for the Facilities:  [address] DENIALS (Administrative/Medical Necessity) [phone]   [address] [place] [address] (Maternity/NICU/Pediatrics) Mckayla Martel [facility] [phone]   [facility] [phone]   [facility] [address] [address] Parker Callejas 28 U [facility] 310 [facility] [address] [phone]             Val East, LILIA  Registered Nurse  Specialty:  Utilization Review  Utilization Review     Signed  Date of Service:  1/21/2023 11:26 AM     Signed        Continued Stay Review     Date:  1/21/2023                       Current Patient Class: inpatient                     Current Level of Care: med surg      HPI:37 y o  female initially Observation 1/18 2051 changed to inpatient 1/20 1420  Continued stay for fresh post ofp after laparoscopic cholecystectomy with intraoperative cholangiogram  Pt requiring continued IV fluids and antibiotics, ERCP     Assessment/Plan:   GEN Surgery: POD1 s/p lap choley with IOC which confirmed stone in CBD  Plan for ERCP today  Cont NPO, IVF, IV pain meds & anti emetics  Cont IV ancef, flagyl;   ABD soft appropr tender; Patient endorses nausea and abdominal pain  Urinating without difficulty   No bowel function    Vital Signs:   Date/Time Temp Pulse Resp BP MAP (mmHg) SpO2 O2 Device Cardiac (WDL) Patient Position - Orthostatic VS   01/21/23 1009 97 7 °F (36 5 °C) 66 18 124/59 -- 99 % None (Room air) -- --   01/21/23 08:21:20 98 °F (36 7 °C) 87 18 118/62 81 -- -- -- --   01/20/23 22:21:04 98 6 °F (37 °C) -- -- 117/69 85 -- -- -- --   01/20/23 14:49:09 99 3 °F (37 4 °C) 70 -- 124/73 90 96 % -- -- --   01/20/23 14:48:56 99 3 °F (37 4 °C) -- -- 124/73 90 -- -- -- --      Pertinent Labs/Diagnostic Results:   1/21/2023 ERCP=  FINDINGS:  • The common bile duct was deeply cannulated after 1 attempt using a traction sphincterotome with guidewire  Cannulation was not difficult and no bleeding was observed  Contrast was injected   Cholangiogram showed common bile duct measuring about 6 to 7 mm with tapering down distally   Small filling defect in the distal bile duct noted   Small sphincterotomy was performed and the duct was swept with balloon catheter with extraction of a small stone    RECOMMENDATION:    observe; clear liquid diet today; lactated Ringer 225 mill per hour              Results from last 7 days   Lab Units 01/18/23  1547   SARS-COV-2   Negative              Results from last 7 days   Lab Units 01/21/23  0558 01/20/23  0636 01/19/23  0502 01/18/23  1547   WBC Thousand/uL 7 35 5 62 5 31 4 65   HEMOGLOBIN g/dL 9 5* 10 5* 10 1* 11 8   HEMATOCRIT % 30 3* 32 7* 31 7* 37 0   PLATELETS Thousands/uL 193 199 188 219   NEUTROS ABS Thousands/µL  --  3 37  --  2 82                  Results from last 7 days   Lab Units 01/21/23  0558 01/20/23  0444 01/19/23  0502 01/18/23  1547   SODIUM mmol/L 136 136 137 137   POTASSIUM mmol/L 3 7 3 8 3 6 4 0   CHLORIDE mmol/L 102 107 107 104   CO2 mmol/L 27 21 21 25   ANION GAP mmol/L 7 8 9 8   BUN mg/dL 7 8 7 8   CREATININE mg/dL 0 55* 0 59* 0 51* 0 60   EGFR ml/min/1 73sq m 120 117 123 116   CALCIUM mg/dL 8 7 8 5 8 5 9 8   MAGNESIUM mg/dL  --   --  1 9  --    PHOSPHORUS mg/dL  --   --  2 7  --               Results from last 7 days   Lab Units 01/21/23  0558 01/20/23  0444 01/19/23  0502 01/18/23  1547   AST U/L 142* 105* 251* 554*   ALT U/L 251* 269* 400* 632*   ALK PHOS U/L 110* 115* 128* 148*   TOTAL PROTEIN g/dL 6 1* 6 2* 6 3* 7 9   ALBUMIN g/dL 3 4* 3 4* 3 6 4 4   TOTAL BILIRUBIN mg/dL 1 19* 1 02* 2 86* 2 48*   BILIRUBIN DIRECT mg/dL  --   --  1 89*  --                   Results from last 7 days   Lab Units 01/21/23  0558 01/20/23  0444 01/19/23  0502 01/18/23  1547   GLUCOSE RANDOM mg/dL 97 75 94 95              No results found for: BETA-HYDROXYBUTYRATE Results from last 7 days   Lab Units 01/18/23  1547   LIPASE u/L 30                       Results from last 7 days   Lab Units 01/18/23  1555   CLARITY UA   Extra Turbid   COLOR UA   Yellow   SPEC GRAV UA   1 016   PH UA   8 5*   GLUCOSE UA mg/dl Negative   KETONES UA mg/dl Negative   BLOOD UA   Negative   PROTEIN UA mg/dl 30 (1+)*   NITRITE UA   Negative   BILIRUBIN UA   Small*   UROBILINOGEN UA (BE) mg/dl <2 0   LEUKOCYTES UA   Negative   WBC UA /hpf None Seen   RBC UA /hpf None Seen   BACTERIA UA /hpf None Seen   EPITHELIAL CELLS WET PREP /hpf Occasional           Results from last 7 days   Lab Units 01/18/23  1547   INFLUENZA A PCR   Negative   INFLUENZA B PCR   Negative   RSV PCR   Negative      Medications:   Scheduled Medications:  cefazolin, 1,000 mg, Intravenous, Q8H  heparin (porcine), 5,000 Units, Subcutaneous, Q8H LUZ ELENA  metroNIDAZOLE, 500 mg, Intravenous, Q8H     Continuous IV Infusions:  lactated ringers, 125 mL/hr, Intravenous, Continuous        PRN Meds:  HYDROmorphone, 0 5 mg, Intravenous, Q1H PRN  indomethacin, , Rectal, PRN  ondansetron, 4 mg, Intravenous, Q6H PRN  oxyCODONE, 5 mg, Oral, Q4H PRN  simethicone, 80 mg, Oral, Q6H PRN     Discharge Plan: D  Network Utilization Review Department  ATTENTION: Please call with any questions or concerns to [phone] and carefully listen to the prompts so that you are directed to the right person  All voicemails are confidential   Saad Hernandez all requests for admission clinical reviews, approved or denied determinations and any other requests to dedicated fax number below belonging to the campus where the patient is receiving treatment   List of dedicated fax numbers for the Facilities:  FACILITY NAME UR FAX NUMBER   ADMISSION DENIALS (Administrative/Medical Necessity) [phone]   [address] (Maternity/NICU/Pediatrics) Mckayla Martel 172 [phone]     Angela Arellano [facility] 77 [phone]   [address] [place] [address] Parker Callejas  [phone]   [address] [address] [phone]

## 2023-01-23 NOTE — PROGRESS NOTES
Progress Note - General Surgery   Mabel Zarate 40 y o  female MRN: [id number]  Unit/Bed#: S -01 Encounter: [id number]    Assessment:  45yoF p/w symptomatic cholelithiasis and choledocholithiasis, now s/p laparoscopic cholecystectomy w IOC on 1/20, found to have stone in mid CBD, s/p ERCP w CBD cannulation/sphincterotomy, stone extraction w GI on 1/21    Vital stable, afebrile  Creatinine 0 57  T bili 0 92 from 2 2    Plan:  - Low fat  - D/c abx  - pain control   - SQH  - incentive spirometry  - encourage ambulation    Subjective/Objective   Subjective:   Pain is improved  Tolerating diet  Having BF  Denies f/c/n/v, CP, and SOB  Objective:     Blood pressure 116/69, pulse 63, temperature 98 9 °F (37 2 °C), resp  rate 16, last menstrual period 12/26/2022, SpO2 98 %  ,There is no height or weight on file to calculate BMI  No intake or output data in the 24 hours ending 01/23/23 0751    Invasive Devices     Peripheral Intravenous Line  Duration           Peripheral IV 01/18/23 Right Antecubital 4 days              Physical Exam:  General: No acute distress, alert and oriented  CV: Well perfused, regular rate and rhythm  Lungs: Normal work of breathing, no increased respiratory effort  Abdomen: Soft, non-tender, non-distended  Incisions clean, dry and intact  Extremities: No edema, clubbing or cyanosis  Skin: Warm, dry    Lab, Imaging and other studies:  I have personally reviewed pertinent lab results    , CBC:   No results found for: WBC, HGB, HCT, MCV, PLT, ADJUSTEDWBC, MCH, MCHC, RDW, MPV, NRBC, CMP:   Lab Results   Component Value Date    SODIUM 135 01/23/2023    K 3 6 01/23/2023     01/23/2023    CO2 25 01/23/2023    BUN 8 01/23/2023    CREATININE 0 57 (L) 01/23/2023    CALCIUM 8 5 01/23/2023     (H) 01/23/2023     (H) 01/23/2023    ALKPHOS 130 (H) 01/23/2023    EGFR 118 01/23/2023     VTE Pharmacologic Prophylaxis: Sequential compression device (Venodyne)  and Heparin  VTE Mechanical Prophylaxis: sequential compression device

## 2023-01-23 NOTE — PROGRESS NOTES
Progress Note - [facility]  Maame Pipe 40 y o  female MRN: [id number]    Unit/Bed#: S -01 Encounter: [id number]      Assessment/Plan:    1  Choledocholithiasis  ERCP performed 1/21 with small filling defect in the distal CBD, small sphincterotomy was performed with duct swept with extraction of small stone  Liver enzymes trending down  Total bilirubin within normal limits  White count normal  Lipase normal  Vital signs stable, afebrile  Patient reports only minimal pain this AM   Tolerating diet  No nausea or vomiting      -Low fat diet as tolerated  -Continue supportive care  -Pain management per attending team  -Antibiotics per surgery team  -Will follow as needed, call GI if needed  Subjective:   Patient reports pain has improved and she only has minimal pain this AM   Tolerating diet  No nausea or vomiting  Passing flatus  Objective:     Vitals: Blood pressure 116/69, pulse 63, temperature 98 9 °F (37 2 °C), resp  rate 16, last menstrual period 12/26/2022, SpO2 98 %  ,There is no height or weight on file to calculate BMI  No intake or output data in the 24 hours ending 01/23/23 1106    Physical Exam: Physical Exam  Vitals reviewed  Constitutional:       General: She is not in acute distress  Cardiovascular:      Rate and Rhythm: Normal rate and regular rhythm  Pulses: Normal pulses  Heart sounds: Normal heart sounds  Pulmonary:      Effort: Pulmonary effort is normal       Breath sounds: Normal breath sounds  Abdominal:      General: Bowel sounds are normal  There is no distension  Palpations: Abdomen is soft  There is no mass  Tenderness: There is abdominal tenderness  There is no guarding or rebound  Comments: Mild tenderness with palpation near surgical sites  Musculoskeletal:      Right lower leg: No edema  Left lower leg: No edema  Skin:     General: Skin is warm and dry  Coloration: Skin is not jaundiced or pale     Neurological:      Mental Status: She is alert and oriented to person, place, and time     Psychiatric:         Mood and Affect: Mood normal          Invasive Devices     Peripheral Intravenous Line  Duration           Peripheral IV 01/18/23 Right Antecubital 4 days                Current Facility-Administered Medications:   •  dextrose 5 % and sodium chloride 0 45 % with KCl 20 mEq/L infusion, 75 mL/hr, Intravenous, Continuous, Tennille Peter MD, Last Rate: 75 mL/hr at 01/22/23 2345, 75 mL/hr at 01/22/23 2345  •  heparin (porcine) subcutaneous injection 5,000 Units, 5,000 Units, Subcutaneous, Q8H [facility], 5,000 Units at 01/23/23 0608 **AND** [CANCELED] Platelet count, , , Once, Frantz Stark MD  •  HYDROmorphone (DILAUDID) injection 0 5 mg, 0 5 mg, Intravenous, Q1H PRN, Evia Octana Conron, DO, 0 5 mg at 01/22/23 0546  •  metoclopramide (REGLAN) injection 10 mg, 10 mg, Intravenous, Q6H PRN, Frantz Stark MD, 10 mg at 01/22/23 4617  •  ondansetron [facility]) injection 4 mg, 4 mg, Intravenous, Q6H PRN, Evia Octave Conron, DO, 4 mg at 01/22/23 0396  •  oxyCODONE (ROXICODONE) IR tablet 5 mg, 5 mg, Oral, Q4H PRN, Hailyia Octave Conron, DO, 5 mg at 01/22/23 0451  •  simethicone (MYLICON) chewable tablet 80 mg, 80 mg, Oral, Q6H PRN, Frantz Stark MD, 80 mg at 01/22/23 1419    Lab Results:   Recent Results (from the past 24 hour(s))   Comprehensive metabolic panel    Collection Time: 01/23/23  5:50 AM   Result Value Ref Range    Sodium 135 135 - 147 mmol/L    Potassium 3 6 3 5 - 5 3 mmol/L    Chloride 103 96 - 108 mmol/L    CO2 25 21 - 32 mmol/L    ANION GAP 7 4 - 13 mmol/L    BUN 8 5 - 25 mg/dL    Creatinine 0 57 (L) 0 60 - 1 30 mg/dL    Glucose 100 65 - 140 mg/dL    Calcium 8 5 8 4 - 10 2 mg/dL     (H) 13 - 39 U/L     (H) 7 - 52 U/L    Alkaline Phosphatase 130 (H) 34 - 104 U/L    Total Protein 6 3 (L) 6 4 - 8 4 g/dL    Albumin 3 5 3 5 - 5 0 g/dL    Total Bilirubin 0 92 0 20 - 1 00 mg/dL    eGFR 118 ml/min/1 73sq m             Imaging Studies: ERCP    Result Date: 1/21/2023  Narrative: Table formatting from the original result was not included  [facility] [address] [facility] [phone] DATE OF SERVICE: 1/21/23 PHYSICIAN(S): Attending: Moris Ward MD Fellow: No Staff Documented INDICATION: Choledocholithiasis POST-OP DIAGNOSIS: See the impression below  PREPROCEDURE: Informed consent was obtained for the procedure, including sedation  Risks of perforation, hemorrhage, adverse drug reaction and aspiration were discussed  The patient was placed in the left lateral decubitus position  Patient was explained about the risks and benefits of the procedure  Risks including but not limited to bleeding, infection, and perforation were explained in detail  Also explained about less than 100% sensitivity with the exam and other alternatives  PROCEDURE: ERCP DETAILS OF PROCEDURE: Patient was taken to the procedure room where a time out was performed to confirm correct patient and correct procedure  The patient underwent general anesthesia, which was administered by an anesthesia professional  The patient's blood pressure, heart rate, level of consciousness, respirations and oxygen were monitored throughout the procedure  Clinical intention was achieved  The patient experienced no blood loss  The procedure was not difficult  The patient tolerated the procedure well  ANESTHESIA INFORMATION: ASA: II Anesthesia Type: Anesthesia type not filed in the log  MEDICATIONS: indomethacin (INDOCIN) rectal suppository 100 mg iohexol (OMNIPAQUE) 240 MG/ML solution 5 mL (Totals for administrations occurring from 1102 to 1144 on 01/21/23) FINDINGS: The common bile duct was deeply cannulated after 1 attempt using a traction sphincterotome with guidewire  Cannulation was not difficult and no bleeding was observed  Contrast was injected   Cholangiogram showed common bile duct measuring about 6 to 7 mm with tapering down distally  Small filling defect in the distal bile duct noted  Small sphincterotomy was performed and the duct was swept with balloon catheter with extraction of a small stone  SPECIMENS: * No specimens in log *      Impression: As above RECOMMENDATION:  observe; clear liquid diet today; lactated Ringer 225 mill per hour  Tono Bell MD     XR cholangiogram intraoperative    Result Date: 1/20/2023  Narrative: INTRAOPERATIVE CHOLANGIOGRAM INDICATION:   Choledocholithiasis  COMPARISON:  MRI from 1/19/2023 VIEWS:  XR CHOLANGIOGRAM INTRAOPERATIVE 284 FLUOROSCOPIC IMAGES FLUOROSCOPY TIME:   22 seconds CONTRAST:  20 mL of iohexol (OMNIPAQUE) FINDINGS: Fluoroscopic guidance provided during performance of intraoperative cholangiogram  There is an abrupt cut off of the mid to distal common bile duct with filling defect and mild proximal dilatation  There is contrast distal to the filling defect  This corresponds to the abnormality on MRI  Osseous and soft tissue detail limited by technique  Impression: Evidence of partially obstructing choledocholithiasis  Please see procedure report for further details  Workstation performed: [id number]     MRI abdomen wo contrast and mrcp    Result Date: 1/19/2023  Narrative: MRI OF THE ABDOMEN WITHOUT CONTRAST WITH MRCP INDICATION: 40 years / Female  Abdominal pain  COMPARISON: 1/18/2023 TECHNIQUE:  Multiplanar/multisequence MRI of the abdomen with 3D MRCP was performed without the administration of contrast  Imaging performed on 1 5T MRI FINDINGS: LOWER CHEST:   Unremarkable  LIVER: Mild hepatic steatosis  No suspicious mass  Limited evaluation of hepatic veins and portal veins on this non-contrast MRI is unremarkable  BILE DUCTS:  No intrahepatic or extrahepatic bile duct dilation  Common bile duct is normal in caliber though there is a caliber change in the mid to distal portion with the duct measuring 5 mm proximally and 2 mm distally    There is a band of hypointensity in this region likely represents a small stone resulting in minimal upstream dilatation  GALLBLADDER:  There are multiple small gallstones with mild pericholecystic fluid  PANCREAS:  There are scattered tiny pancreatic cysts seen only on MRCP images with clustered cysts in the head/uncinate process  There is no definite communication with the nondilated main pancreatic duct  ADRENAL GLANDS:  Normal  SPLEEN:  Normal  KIDNEYS/PROXIMAL URETERS:  No hydroureteronephrosis  No suspicious renal mass  BOWEL:  No dilated loops of bowel  PERITONEAL CAVITY/RETROPERITONEUM:  No ascites  No mass  LYMPH NODES:  No abdominal lymphadenopathy  VASCULAR STRUCTURES:  Unremarkable  No aneurysm  ABDOMINAL WALL:  Unremarkable  OSSEOUS STRUCTURES:  No suspicious osseous lesion  Impression: 1  Slight caliber change of the common bile duct with hypoechogenicity at the transition likely representing a small stone, particularly given small stones in the gallbladder and elevated bilirubin  2   Cholelithiasis with pericholecystic fluid, not present on the prior ultrasound  This is suspicious for acute cholecystitis and clinical correlation is recommended  3   Tiny scattered pancreatic cysts not definitely communicating with the nondilated pancreatic duct  These are nonspecific an follow-up is recommended  For simple cyst(s) less than 1 5 cm, recommend yearly followup 5 times, then every 2 year for 2 times  If cyst(s) stable after 9 years, no further followups  Recommend next followup in 1 year  Preferred imaging modality: abdomen MRI and MRCP with and without IV contrast, or triple phase abdomen CT with IV contrast, or abdomen MRI and MRCP without IV contrast  4   Mild hepatic steatosis   The recommendations regarding pancreatic findings assumes that patient does not have family history of pancreatic cancer nor have any symptoms potentially attributable to pancreatic cystic lesions (hyperamylasemia, recent-onset diabetes, severe epigastric pain, weight loss, steatorrhea, or jaundice ) If these conditions are not true, then management should be deferred to judgement of specialists such as gastroenterologists or oncologic surgeons  Recommendations are based on recent consensus statements on management of pancreatic cystic lesions from [address] [place] [address] Gastro[employer]ology Association, [employer], the journal Pancreatology, and our own institutional consensus  The study was marked in [facility] for immediate notification  Workstation performed: [id number]     FL ERCP biliary only    Result Date: 1/23/2023  Narrative: ERCP INDICATION:  Choledocholithiasis COMPARISON:  1/19/2023; 1/18/2023 IMAGES:  6 FLUOROSCOPY TIME:   134 seconds fluoro time CONTRAST: 5 mL of iohexol (OMNIPAQUE) FINDINGS: Fluoroscopic guidance was provided for performance of ERCP  BILIARY: Slight caliber change in the mid common bile duct similar to the prior MRCP Cholecystectomy clips noted  Cystic duct is distended without evidence of extravasated contrast from the biliary tree  No intrahepatic biliary ductal dilatation  Impression: Slight caliber change in the mid common bile duct correlating findings on the MRCP  Workstation performed: Primitivo Rodarte right upper quadrant    Result Date: 1/18/2023  Narrative: RIGHT UPPER QUADRANT ULTRASOUND INDICATION:     transaminitis  COMPARISON:  None TECHNIQUE:   Real-time ultrasound of the right upper quadrant was performed with a curvilinear transducer with both volumetric sweeps and still imaging techniques  FINDINGS: PANCREAS:  Visualized portions of the pancreas are within normal limits  AORTA AND IVC:  Visualized portions are normal for patient age  LIVER: Size:  Within normal range  The liver measures 15 8 cm in the midclavicular line  Contour:  Surface contour is smooth  Parenchyma:  Echogenicity and echotexture are within normal limits  No liver mass identified   Limited imaging of the main portal vein shows it to be patent and hepatopetal   BILIARY: The gallbladder is normal in caliber  No wall thickening or pericholecystic fluid  Shadowing gallstone(s) identified  No sonographic Traore's sign  No intrahepatic biliary dilatation  CBD measures 4 0 mm  No choledocholithiasis  KIDNEY: Right kidney measures 9 2 x 4 6 x 4 7 cm  Volume 104 0 mL Kidney within normal limits  ASCITES:   None  Impression: Cholelithiasis with no evidence of acute cholecystitis  No biliary dilatation  Otherwise, unremarkable right upper quadrant exam  Workstation performed: [id number]           JOSE Raphael      Please Note: "This note has been constructed using a voice recognition system  Therefore there may be syntax, spelling, and/or grammatical errors   Please call if you have any questions  "**

## 2023-01-23 NOTE — ANESTHESIA POSTPROCEDURE EVALUATION
Post-Op Assessment Note    CV Status:  Stable    Pain management: adequate     Mental Status:  Alert and awake   Hydration Status:  Euvolemic   PONV Controlled:  Controlled   Airway Patency:  Patent      Post Op Vitals Reviewed: Yes      Staff: Anesthesiologist         No notable events documented      BP   128/62   Temp   97 7F   Pulse  85   Resp   18   SpO2   100%

## 2023-01-25 NOTE — UTILIZATION REVIEW
NOTIFICATION OF ADMISSION DISCHARGE   This is a Notification of Discharge from [facility]  Please be advised that this patient has been discharge from our facility  Below you will find the admission and discharge date and time including the patient’s disposition  UTILIZATION REVIEW CONTACT:  Mary Carmen Merino MA  Utilization   Network Utilization Review Department  Phone: [phone] x carefully listen to the prompts  All voicemails are confidential   Email: [email or web address]     ADMISSION INFORMATION  PRESENTATION DATE: 1/18/2023  6:16 PM  OBERVATION ADMISSION DATE:   INPATIENT ADMISSION DATE: 1/20/23  2:20 PM   DISCHARGE DATE: 1/23/2023 11:35 AM   DISPOSITION:Home/Self Care    IMPORTANT INFORMATION:  Send all requests for admission clinical reviews, approved or denied determinations and any other requests to dedicated fax number below belonging to the campus where the patient is receiving treatment   List of dedicated fax numbers:  [address] [place] [address] DENIALS (Administrative/Medical Necessity) [phone]   [address] (Maternity/NICU/Pediatrics) [phone]   [facility] [phone]   [facility] 87 [phone]   [address] [phone]   220 [facility] [phone] 90 [facility] [phone]   [address] [place] [address] [phone]   [facility]  [phone]   [address] [facility] [phone]   [address] [facility] [address] [facility] [phone]

## 2023-01-31 ENCOUNTER — OFFICE VISIT (OUTPATIENT)
Dept: FAMILY MEDICINE CLINIC | Facility: CLINIC | Age: 38
End: 2023-01-31

## 2023-01-31 VITALS
TEMPERATURE: 98.5 F | WEIGHT: 187 LBS | DIASTOLIC BLOOD PRESSURE: 59 MMHG | HEART RATE: 68 BPM | BODY MASS INDEX: 31.16 KG/M2 | SYSTOLIC BLOOD PRESSURE: 115 MMHG | RESPIRATION RATE: 16 BRPM | HEIGHT: 65 IN

## 2023-01-31 DIAGNOSIS — Z13.0 SCREENING FOR DEFICIENCY ANEMIA: ICD-10-CM

## 2023-01-31 DIAGNOSIS — R10.9 ABDOMINAL PAIN, UNSPECIFIED ABDOMINAL LOCATION: ICD-10-CM

## 2023-01-31 DIAGNOSIS — K86.2 PANCREATIC CYST: ICD-10-CM

## 2023-01-31 DIAGNOSIS — Z09 HOSPITAL DISCHARGE FOLLOW-UP: Primary | ICD-10-CM

## 2023-01-31 DIAGNOSIS — Z13.220 SCREENING FOR HYPERLIPIDEMIA: ICD-10-CM

## 2023-01-31 DIAGNOSIS — Z13.1 SCREENING FOR DIABETES MELLITUS (DM): ICD-10-CM

## 2023-01-31 RX ORDER — NAPROXEN 500 MG/1
500 TABLET ORAL 2 TIMES DAILY WITH MEALS
Qty: 20 TABLET | Refills: 0 | Status: SHIPPED | OUTPATIENT
Start: 2023-01-31 | End: 2023-02-07

## 2023-01-31 NOTE — ASSESSMENT & PLAN NOTE
Reviewed hospital course  No new medicines started  Given naproxen for pain relief  Appropriate follow ups are already scheduled  For pancreatic cysts, will repeat MRI q year [id number] then x2y [id number]

## 2023-01-31 NOTE — PROGRESS NOTES
Assessment & Plan     1  Hospital discharge follow-up  Assessment & Plan:  Reviewed hospital course  No new medicines started  Given naproxen for pain relief  Appropriate follow ups are already scheduled  For pancreatic cysts, will repeat MRI q year [id number] then x2y [id number]  2  Abdominal pain, unspecified abdominal location  -     naproxen (NAPROSYN) 500 mg tablet; Take 1 tablet (500 mg total) by mouth 2 (two) times a day with meals for 7 days    3  Pancreatic cyst  -     MRI abdomen w wo contrast and mrcp; Future; Expected date: 01/19/2024    4  Screening for deficiency anemia  -     CBC and differential; Future; Expected date: 04/30/2023    5  Screening for hyperlipidemia  -     Lipid panel; Future; Expected date: 04/30/2023    6  Screening for diabetes mellitus (DM)  -     Comprehensive metabolic panel; Future; Expected date: 04/30/2023         Subjective     Transitional Care Management Review:   Asha Villatoro is a 40 y o  female here for TCM follow up  The pt was hospitalized from 1/18-1/23 for choledocholithiasis  She had a abnormal MRCP indicative of choledocolithiasis  She underwent a cholecystectomy on 1/20 and an ERCP with sphincterectomy and stone extraction 1/21/23  Her pain today is improved  The pain is worse at night at the surgical site  She has oxycodone but hasn't used it  She has a fu with surgery on 2/2/23  Reviewed MRI abdomen  There were tiny scattered pancreatic cysts  Follow up is recommended with abd MRI and MRCP with a without contrast yearly for 5 times, then every 2 years for 2 times       During the TCM phone call patient stated:  TCM Call     Date and time call was made  1/23/2023  8:08 AM    Hospital care reviewed  Records not available    Patient [facility]ial[facility] at  [facility]    Date of Admission  01/18/23    Date of discharge  01/23/23    Diagnosis  Choledocholithiasis    Disposition  Home    Current Symptoms  None  Abdominal pain      TCM Call     Post hospital issues  None    Scheduled for follow up? Yes    Did you obtain your prescribed medications  Yes    Do you need help managing your prescriptions or medications  No    Is transportation to your appointment needed  No    I have advised the patient to call PCP with any new or worsening symptoms  Matteo Jovel MA    Living Arrangements  Alone    Are you recieving any outpatient services  No    Are you recieving home care services  No    Current waiver services  No    Have you fallen in the last 12 months  No    Interperter language line needed  No    Counseling  Patient    Counseling topics  Activities of daily living        HPI  Review of Systems   Constitutional: Negative for fever and unexpected weight change  HENT: Negative for ear pain, sore throat and trouble swallowing  Eyes: Negative for pain and visual disturbance  Respiratory: Negative for cough, chest tightness, shortness of breath and wheezing  Cardiovascular: Negative for chest pain  Gastrointestinal: Positive for abdominal pain  Negative for abdominal distention, blood in stool, constipation, diarrhea, nausea and vomiting  Endocrine: Negative for polydipsia and polyuria  Genitourinary: Negative for dysuria and hematuria  Musculoskeletal: Negative for back pain and myalgias  Skin: Negative for rash  Neurological: Negative for syncope and headaches  Psychiatric/Behavioral: Negative for suicidal ideas  Objective     /59 (BP Location: Left arm, Patient Position: Sitting, Cuff Size: Adult)   Pulse 68   Temp 98 5 °F (36 9 °C) (Tympanic)   Resp 16   Ht 5' 5" (1 651 m)   Wt 84 8 kg (187 lb)   BMI 31 12 kg/m²      Physical Exam  Constitutional:       Appearance: She is well-developed  HENT:      Head: Normocephalic and atraumatic  Eyes:      General: No scleral icterus  Conjunctiva/sclera: Conjunctivae normal       Pupils: Pupils are equal, round, and reactive to light     Cardiovascular:      Rate and Rhythm: Normal rate and regular rhythm  Heart sounds: Normal heart sounds  No murmur heard  No friction rub  No gallop  Pulmonary:      Effort: Pulmonary effort is normal  No respiratory distress  Breath sounds: No wheezing or rales  Chest:      Chest wall: No tenderness  Abdominal:      General: Bowel sounds are normal  There is no distension  Palpations: Abdomen is soft  There is no mass  Tenderness: There is abdominal tenderness (diffuse)  There is no guarding or rebound  Musculoskeletal:         General: Normal range of motion  Cervical back: Normal range of motion and neck supple  Lymphadenopathy:      Cervical: No cervical adenopathy  Skin:     General: Skin is warm and dry  Capillary Refill: Capillary refill takes less than 2 seconds  Findings: No rash  Neurological:      Mental Status: She is alert and oriented to person, place, and time  Cranial Nerves: No cranial nerve deficit         Medications have been reviewed by provider in current encounter    Lesly Singleton MD

## 2023-02-07 ENCOUNTER — OFFICE VISIT (OUTPATIENT)
Dept: SURGERY | Facility: CLINIC | Age: 38
End: 2023-02-07

## 2023-02-07 DIAGNOSIS — Z09 HOSPITAL DISCHARGE FOLLOW-UP: Primary | ICD-10-CM

## 2023-02-07 NOTE — PROGRESS NOTES
Assessment/Plan: Patient is status post laparoscopic cholecystectomy  She feels well  She offers no complaints  She is advance her activities nicely  All questions answered  There are no diagnoses linked to this encounter  Pathology: Reviewed with patient, all questions answered  Postoperative restrictions reviewed  All questions answered  ______________________________________________________  HPI: Patient presents post operatively  Laparoscopic cholecystectomy 2023   Final Diagnosis  A  Gall Bladder, Laparoscopic Cholecystectomy:  - Chronic cholecystitis with cholelithiasis  - One, morphologically benign lymph node  ROS:  General ROS: negative for - chills, fatigue, fever or night sweats, weight loss  Respiratory ROS: no cough, shortness of breath, or wheezing  Cardiovascular ROS: no chest pain or dyspnea on exertion  Genito-Urinary ROS: no dysuria, trouble voiding, or hematuria  Musculoskeletal ROS: negative for - gait disturbance, joint pain or muscle pain  Neurological ROS: no TIA or stroke symptoms  GI ROS: see HPI  Skin ROS: no new rashes or lesions   Lymphatic ROS: no new adenopathy noted by pt  GYN ROS: see HPI, no new GYN history or bleeding noted  Psy ROS: no new mental or behavioral disturbances         Patient Active Problem List   Diagnosis   • Chronic bilateral low back pain without sciatica   • Annual physical exam   • Poor dentition   • Seasonal allergic rhinitis   • Tension headache   • Obesity (BMI 30-39  9)   • Hospital discharge follow-up       Allergies:  Patient has no known allergies  Current Outpatient Medications:   •  naproxen (NAPROSYN) 500 mg tablet, Take 1 tablet (500 mg total) by mouth 2 (two) times a day with meals for 7 days, Disp: 20 tablet, Rfl: 0    No past medical history on file      Past Surgical History:   Procedure Laterality Date   •  SECTION     • CHOLECYSTECTOMY LAPAROSCOPIC N/A 2023    Procedure: CHOLECYSTECTOMY LAPAROSCOPIC W/ INTRAOP CHOLANGIOGRAM;  Surgeon: Kerri Hou DO;  Location: [facility];  Service: General       Family History   Problem Relation Age of Onset   • Hypertension Mother    • Thyroid disease Mother    • Hyperlipidemia Mother    • Diabetes Father    • Hypertension Father    • Colon cancer Paternal Grandfather    • Colon cancer Paternal Uncle    • No Known Problems Brother    • Cancer Paternal Grandmother         reports that she has never smoked  She has never used smokeless tobacco  She reports that she does not currently use alcohol  She reports that she does not currently use drugs  PHYSICAL EXAM    There were no vitals taken for this visit      General: normal, cooperative, no distress  Abdominal: soft, nondistended or nontender  Incision: clean, dry, and intact and healing well      Latia Fortune MD    Date: 2/7/2023 Time: 8:18 AM

## 2023-03-31 ENCOUNTER — OFFICE VISIT (OUTPATIENT)
Dept: DENTISTRY | Facility: CLINIC | Age: 38
End: 2023-03-31

## 2023-03-31 VITALS — HEART RATE: 85 BPM | DIASTOLIC BLOOD PRESSURE: 66 MMHG | SYSTOLIC BLOOD PRESSURE: 126 MMHG | TEMPERATURE: 98.9 F

## 2023-03-31 DIAGNOSIS — Z59.9 FINANCIAL DIFFICULTIES: ICD-10-CM

## 2023-03-31 DIAGNOSIS — Z59.41 FOOD INSECURITY: ICD-10-CM

## 2023-03-31 SDOH — ECONOMIC STABILITY - FOOD INSECURITY: FOOD INSECURITY: Z59.41

## 2023-03-31 SDOH — ECONOMIC STABILITY - INCOME SECURITY: PROBLEM RELATED TO HOUSING AND ECONOMIC CIRCUMSTANCES, UNSPECIFIED: Z59.9

## 2023-03-31 NOTE — DENTAL PROCEDURE DETAILS
Pedro Cabrera presents for a Comprehensive exam  Verbal consent for treatment given in addition to the forms  Reviewed health history - Patient is ASA II  Consents signed: Yes     Perio: Gingivitis  Pain Scale: 0  Caries Assessment: Medium  Radiographs: Complete mouth series     Oral Hygiene instruction reviewed and given  Recommended Hygiene recall visits with the Rodri Melo  OLIVIA/FMX    Reviewed medical history with patient  ASA - II  Pain - #9 is cold sensitive  Pts CC - Unable to drink or eat anything cold  The cold weather causes discomfort as well  FMX - Obtained and viewed    Perio charting - 1A Patient is returning for prophy    OLIVIA completed by Dr Brown  -OCS - No findings  -#2 needs O resin  -#4 needs a core and crown  -#7 needs L resin  -#9 Pts CC / explained to patient that the decay is deep and could need root canal   Cold test was preformed  -#13 needs MO resin  -#14 needs O resin  -#17 watch the O    NV:Prophy  NV2:#9 Palliative treatment with poss endo  Pre D was submitted    NV3:Caries control

## 2023-04-06 ENCOUNTER — PATIENT OUTREACH (OUTPATIENT)
Dept: PEDIATRICS CLINIC | Facility: CLINIC | Age: 38
End: 2023-04-06

## 2023-04-06 NOTE — PROGRESS NOTES
"A referral was placed by [address] [place] [address] for a risk response to Social Determinant of Health ( food insecurity) at patient's office visit  OP-SW contacted patient via phone call in [facility], introduced self, role and discuss purpose of the referral and outreach  Patient reported, she is employed, does not receive any assistance from the government only medical insurance  She doesn't meet criteria for SNAP's benefits  Patient resides with   Patient denies experiencing financial difficulties, but did admitted that sometimes is hard to buy food due to \"high prices\"  Patient  receptive to food moon resources  Phone numbers to local food moon was provided  Patient recommended to contact her PCP's office-[facility], for additional assistance if needs arises  Patient verbalized understanding    "

## 2023-04-24 ENCOUNTER — OFFICE VISIT (OUTPATIENT)
Dept: DENTISTRY | Facility: CLINIC | Age: 38
End: 2023-04-24

## 2023-04-24 VITALS — HEART RATE: 88 BPM | DIASTOLIC BLOOD PRESSURE: 77 MMHG | SYSTOLIC BLOOD PRESSURE: 116 MMHG | TEMPERATURE: 98.5 F

## 2023-04-24 DIAGNOSIS — K02.9 CARIES: Primary | ICD-10-CM

## 2023-04-24 NOTE — PROGRESS NOTES
Palliative/Composite Filling    Violet Serna presents for palliative for lingering sensitivity on tooth #9, no endo performed, completed a composite filling  PMH reviewed, no changes  ASA 2, pain 6  Discussed with patient need for RCT if pulp exposure occurs or in future if pulp is inflamed  Pt understands and consents  Applied topical benzocaine, administered 1 carp 4% septocaine 1:100k epi via buccal infiltration  Prepped tooth #9 MFL with 245 carbide on high speed  Caries removed with round carbide on slow speed  Placed mylar matrix and wedge  Isolation with cotton rolls  Limelite placed and cured  Etch with 37% H2PO4, rinse, dry  Applied Adhese with 20 second scrub once, gentle air dry and light cured for 10s  Restored with Tetric bulk iam shade A2 and light cured  Refined with finishing burs, polished with enhance point  Verified occlusion and contacts  Advised pt the decay was not into the pulp and there is no need for endo today, but if she has pain in the future she should call and come back in for elective endo on #9  Pt understood  RCT #9 sent for pre-d at last visit  Pt left satisfied      NV: resins

## 2023-04-28 ENCOUNTER — APPOINTMENT (OUTPATIENT)
Dept: LAB | Facility: CLINIC | Age: 38
End: 2023-04-28

## 2023-04-28 DIAGNOSIS — Z13.0 SCREENING FOR DEFICIENCY ANEMIA: ICD-10-CM

## 2023-04-28 DIAGNOSIS — Z13.1 SCREENING FOR DIABETES MELLITUS (DM): ICD-10-CM

## 2023-04-28 DIAGNOSIS — Z13.220 SCREENING FOR HYPERLIPIDEMIA: ICD-10-CM

## 2023-04-28 LAB
ALBUMIN SERPL BCP-MCNC: 4.3 G/DL (ref 3.5–5)
ALP SERPL-CCNC: 50 U/L (ref 34–104)
ALT SERPL W P-5'-P-CCNC: 15 U/L (ref 7–52)
ANION GAP SERPL CALCULATED.3IONS-SCNC: 6 MMOL/L (ref 4–13)
ANISOCYTOSIS BLD QL SMEAR: PRESENT
AST SERPL W P-5'-P-CCNC: 15 U/L (ref 13–39)
BASOPHILS # BLD MANUAL: 0 THOUSAND/UL (ref 0–0.1)
BASOPHILS NFR MAR MANUAL: 0 % (ref 0–1)
BILIRUB SERPL-MCNC: 0.46 MG/DL (ref 0.2–1)
BUN SERPL-MCNC: 12 MG/DL (ref 5–25)
CALCIUM SERPL-MCNC: 9.6 MG/DL (ref 8.4–10.2)
CHLORIDE SERPL-SCNC: 104 MMOL/L (ref 96–108)
CHOLEST SERPL-MCNC: 192 MG/DL
CO2 SERPL-SCNC: 28 MMOL/L (ref 21–32)
CREAT SERPL-MCNC: 0.58 MG/DL (ref 0.6–1.3)
EOSINOPHIL # BLD MANUAL: 0.11 THOUSAND/UL (ref 0–0.4)
EOSINOPHIL NFR BLD MANUAL: 2 % (ref 0–6)
ERYTHROCYTE [DISTWIDTH] IN BLOOD BY AUTOMATED COUNT: 16.2 % (ref 11.6–15.1)
GFR SERPL CREATININE-BSD FRML MDRD: 118 ML/MIN/1.73SQ M
GLUCOSE P FAST SERPL-MCNC: 81 MG/DL (ref 65–99)
HCT VFR BLD AUTO: 34.9 % (ref 34.8–46.1)
HDLC SERPL-MCNC: 56 MG/DL
HGB BLD-MCNC: 10.8 G/DL (ref 11.5–15.4)
LDLC SERPL CALC-MCNC: 124 MG/DL (ref 0–100)
LYMPHOCYTES # BLD AUTO: 1.65 THOUSAND/UL (ref 0.6–4.47)
LYMPHOCYTES # BLD AUTO: 30 % (ref 14–44)
MCH RBC QN AUTO: 26 PG (ref 26.8–34.3)
MCHC RBC AUTO-ENTMCNC: 30.9 G/DL (ref 31.4–37.4)
MCV RBC AUTO: 84 FL (ref 82–98)
MONOCYTES # BLD AUTO: 0.22 THOUSAND/UL (ref 0–1.22)
MONOCYTES NFR BLD: 4 % (ref 4–12)
NEUTROPHILS # BLD MANUAL: 3.53 THOUSAND/UL (ref 1.85–7.62)
NEUTS SEG NFR BLD AUTO: 64 % (ref 43–75)
NONHDLC SERPL-MCNC: 136 MG/DL
PLATELET # BLD AUTO: 265 THOUSANDS/UL (ref 149–390)
PLATELET BLD QL SMEAR: ADEQUATE
PMV BLD AUTO: 11 FL (ref 8.9–12.7)
POTASSIUM SERPL-SCNC: 4.1 MMOL/L (ref 3.5–5.3)
PROT SERPL-MCNC: 7.4 G/DL (ref 6.4–8.4)
RBC # BLD AUTO: 4.15 MILLION/UL (ref 3.81–5.12)
RBC MORPH BLD: PRESENT
SODIUM SERPL-SCNC: 138 MMOL/L (ref 135–147)
TRIGL SERPL-MCNC: 58 MG/DL
WBC # BLD AUTO: 5.51 THOUSAND/UL (ref 4.31–10.16)

## 2023-05-05 ENCOUNTER — OFFICE VISIT (OUTPATIENT)
Dept: FAMILY MEDICINE CLINIC | Facility: CLINIC | Age: 38
End: 2023-05-05

## 2023-05-05 VITALS
HEART RATE: 68 BPM | OXYGEN SATURATION: 98 % | WEIGHT: 190 LBS | TEMPERATURE: 97.5 F | SYSTOLIC BLOOD PRESSURE: 120 MMHG | RESPIRATION RATE: 16 BRPM | BODY MASS INDEX: 31.65 KG/M2 | HEIGHT: 65 IN | DIASTOLIC BLOOD PRESSURE: 68 MMHG

## 2023-05-05 DIAGNOSIS — T78.40XS ALLERGY, SEQUELA: ICD-10-CM

## 2023-05-05 DIAGNOSIS — Z23 ENCOUNTER FOR VACCINATION: Primary | ICD-10-CM

## 2023-05-05 DIAGNOSIS — Z13.1 SCREENING FOR DIABETES MELLITUS (DM): ICD-10-CM

## 2023-05-05 DIAGNOSIS — D50.0 IRON DEFICIENCY ANEMIA DUE TO CHRONIC BLOOD LOSS: ICD-10-CM

## 2023-05-05 DIAGNOSIS — Z00.00 ANNUAL PHYSICAL EXAM: ICD-10-CM

## 2023-05-05 DIAGNOSIS — R10.9 ABDOMINAL PAIN, UNSPECIFIED ABDOMINAL LOCATION: ICD-10-CM

## 2023-05-05 PROBLEM — T78.40XA ALLERGIES: Status: ACTIVE | Noted: 2023-05-05

## 2023-05-05 RX ORDER — FERROUS SULFATE TAB EC 324 MG (65 MG FE EQUIVALENT) 324 (65 FE) MG
324 TABLET DELAYED RESPONSE ORAL
Qty: 30 TABLET | Refills: 5 | Status: SHIPPED | OUTPATIENT
Start: 2023-05-05

## 2023-05-05 RX ORDER — NAPROXEN 500 MG/1
500 TABLET ORAL 2 TIMES DAILY WITH MEALS
Qty: 30 TABLET | Refills: 5 | Status: SHIPPED | OUTPATIENT
Start: 2023-05-05

## 2023-05-05 RX ORDER — OLOPATADINE HYDROCHLORIDE 1 MG/ML
1 SOLUTION/ DROPS OPHTHALMIC 2 TIMES DAILY
Qty: 5 ML | Refills: 2 | Status: SHIPPED | OUTPATIENT
Start: 2023-05-05

## 2023-05-05 RX ORDER — FERROUS SULFATE TAB EC 324 MG (65 MG FE EQUIVALENT) 324 (65 FE) MG
324 TABLET DELAYED RESPONSE ORAL
Qty: 30 TABLET | Refills: 5 | Status: SHIPPED | OUTPATIENT
Start: 2023-05-05 | End: 2023-05-05

## 2023-05-05 NOTE — PROGRESS NOTES
[facility]    NAME: Desiree Quijano  AGE: 40 y o  SEX: female  : 1985     DATE: 2023     Assessment and Plan:     Problem List Items Addressed This Visit        Other    Annual physical exam     Normal exam    Screening labs ordered for next year  Lipids well controlled   No family ho colon cancer but patient has a paternal uncle and paternal grandfather with colon cancer at age 76 and 79 respectively  Start screening at age 39  Iron deficiency anemia due to chronic blood loss     Use ferrous sulfate daily for at least 2 months  TO lighten the menses use naproxen bid for the first 3 days  Relevant Medications    ferrous sulfate 324 (65 Fe) mg    Other Relevant Orders    CBC and differential    Allergies     Pt taking flonase and allegra daily  Symptoms occur year round  Add patanol  Get allergy testing including dog abs  Pt looking to replace the carpeting in her house  If no improvement will sent to allergy  Relevant Medications    olopatadine (PATANOL) 0 1 % ophthalmic solution    Other Relevant Orders    [facility] [facility]ergy Panel, Adult    Dog dander IgE   Other Visit Diagnoses     Encounter for vaccination    -  Primary    Relevant Orders    TDAP VACCINE GREATER THAN OR EQUAL TO 8YO IM (Completed)    Abdominal pain, unspecified abdominal location        Relevant Medications    naproxen (NAPROSYN) 500 mg tablet    Screening for diabetes mellitus (DM)        Relevant Orders    Basic metabolic panel          Immunizations and preventive care screenings were discussed with patient today  Appropriate education was printed on patient's after visit summary  Counseling:  Alcohol/drug use: discussed moderation in alcohol intake, the recommendations for healthy alcohol use, and avoidance of illicit drug use    Dental Health: discussed importance of regular tooth brushing, flossing, and dental visits  Exercise: the importance of regular exercise/physical activity was discussed  Recommend exercise 3-5 times per week for at least 30 minutes  BMI Counseling: Body mass index is 31 62 kg/m²  The BMI is above normal  Nutrition recommendations include decreasing portion sizes and consuming healthier snacks  Exercise recommendations include moderate physical activity 150 minutes/week  Rationale for BMI follow-up plan is due to patient being overweight or obese  Return in 1 year (on 5/5/2024)  Chief Complaint:     Chief Complaint   Patient presents with    3 month follow up       History of Present Illness:     Adult Annual Physical   Patient here for a comprehensive physical exam  The patient reports no problems  Labs reviewed  CMP showed normalized liver enzymes  Fasting sugars WNL  Lipids controlled, cbc showed iron def anemia with hg 10 8 and normal MCV  Diet and Physical Activity  Diet/Nutrition: well balanced diet  Exercise: no formal exercise  Depression Screening  PHQ-2/9 Depression Screening         General Health  Sleep: sleeps well  Hearing: n oissues  Vision: goes for regular eye exams  Dental: regular dental visits  /GYN Health  Last menstrual period: 4/20/23  Contraceptive method: tubal ligation  History of STDs?: no      Review of Systems:     Review of Systems   Constitutional: Negative for fever and unexpected weight change  HENT: Positive for congestion, postnasal drip and rhinorrhea  Negative for ear pain, sore throat and trouble swallowing  Eyes: Positive for itching  Negative for pain and visual disturbance  Respiratory: Negative for cough, chest tightness, shortness of breath and wheezing  Cardiovascular: Negative for chest pain  Gastrointestinal: Negative for abdominal distention, abdominal pain, blood in stool, constipation, diarrhea, nausea and vomiting  Endocrine: Negative for polydipsia and polyuria     Genitourinary: Negative for dysuria and hematuria  Musculoskeletal: Negative for back pain and myalgias  Skin: Negative for rash  Neurological: Negative for syncope and headaches  Psychiatric/Behavioral: Negative for suicidal ideas  Past Medical History:     No past medical history on file  Past Surgical History:     Past Surgical History:   Procedure Laterality Date     SECTION      CHOLECYSTECTOMY LAPAROSCOPIC N/A 2023    Procedure: CHOLECYSTECTOMY LAPAROSCOPIC W/ INTRAOP CHOLANGIOGRAM;  Surgeon: Ashvin Hou DO;  Location: [facility];  Service: General      Social History:     Social History     Socioeconomic History    Marital status: /Civil Union     Spouse name: None    Number of children: 2    Years of education: None    Highest education level: None   Occupational History    None   Tobacco Use    Smoking status: Never    Smokeless tobacco: Never   Vaping Use    Vaping Use: Never used   Substance and Sexual Activity    Alcohol use: Not Currently     Comment: occasionally    Drug use: Not Currently    Sexual activity: Yes     Partners: Male   Other Topics Concern    None   Social History Narrative    None     Social Determinants of Health     Financial Resource Strain: High Risk    Difficulty of Paying Living Expenses: Very hard   Food Insecurity: Food Insecurity Present    Worried About Running Out of Food in the Last Year: Sometimes true    Damien of Food in the Last Year: Sometimes true   Transportation Needs: No Transportation Needs    Lack of Transportation (Medical): No    Lack of Transportation (Non-Medical):  No   Physical Activity: Not on file   Stress: Not on file   Social Connections: Not on file   Intimate Partner Violence: Not on file   Housing Stability: Not on file      Family History:     Family History   Problem Relation Age of Onset    Hypertension Mother     Thyroid disease Mother     Hyperlipidemia Mother     Diabetes Father    Bernice Poisson "Hypertension Father     Colon cancer Paternal Grandfather     Colon cancer Paternal Uncle     No Known Problems Brother     Cancer Paternal Grandmother       Current Medications:     Current Outpatient Medications   Medication Sig Dispense Refill    ferrous sulfate 324 (65 Fe) mg Take 1 tablet (324 mg total) by mouth daily before breakfast 30 tablet 5    naproxen (NAPROSYN) 500 mg tablet Take 1 tablet (500 mg total) by mouth 2 (two) times a day with meals 30 tablet 5    olopatadine (PATANOL) 0 1 % ophthalmic solution Administer 1 drop to both eyes 2 (two) times a day 5 mL 2     No current facility-administered medications for this visit  Allergies:     No Known Allergies   Physical Exam:     /68 (BP Location: Left arm, Patient Position: Sitting, Cuff Size: Adult)   Pulse 68   Temp 97 5 °F (36 4 °C) (Temporal)   Resp 16   Ht 5' 5\" (1 651 m)   Wt 86 2 kg (190 lb)   SpO2 98%   BMI 31 62 kg/m²     Physical Exam  Constitutional:       Appearance: She is well-developed  HENT:      Head: Normocephalic and atraumatic  Eyes:      General: No scleral icterus  Conjunctiva/sclera: Conjunctivae normal       Pupils: Pupils are equal, round, and reactive to light  Cardiovascular:      Rate and Rhythm: Normal rate and regular rhythm  Heart sounds: Normal heart sounds  No murmur heard  No friction rub  No gallop  Pulmonary:      Effort: Pulmonary effort is normal  No respiratory distress  Breath sounds: No wheezing or rales  Chest:      Chest wall: No tenderness  Abdominal:      General: Bowel sounds are normal  There is no distension  Palpations: Abdomen is soft  There is no mass  Tenderness: There is no abdominal tenderness  Musculoskeletal:         General: Normal range of motion  Cervical back: Normal range of motion  Skin:     General: Skin is warm and dry  Findings: No rash     Neurological:      Mental Status: She is alert and oriented to person, " place, and time  Cranial Nerves: No cranial nerve deficit            Kaila Call MD   [address] [place] [address]

## 2023-05-05 NOTE — ASSESSMENT & PLAN NOTE
Use ferrous sulfate daily for at least 2 months  TO lighten the menses use naproxen bid for the first 3 days

## 2023-05-05 NOTE — ASSESSMENT & PLAN NOTE
Normal exam    Screening labs ordered for next year  Lipids well controlled 2023  No family ho colon cancer but patient has a paternal uncle and paternal grandfather with colon cancer at age 76 and 79 respectively  Start screening at age 39

## 2023-05-05 NOTE — ASSESSMENT & PLAN NOTE
Pt taking flonase and allegra daily  Symptoms occur year round  Add patanol  Get allergy testing including dog abs  Pt looking to replace the carpeting in her house  If no improvement will sent to allergy

## (undated) DEVICE — PACK PBDS LAP CHOLE RF

## (undated) DEVICE — DRAPE EQUIPMENT RF WAND

## (undated) DEVICE — LIGAMAX 5 MM ENDOSCOPIC MULTIPLE CLIP APPLIER: Brand: LIGAMAX

## (undated) DEVICE — PAD GROUNDING ADULT

## (undated) DEVICE — TOWEL SURG XR DETECT GREEN STRL RFD

## (undated) DEVICE — TROCAR: Brand: KII FIOS FIRST ENTRY

## (undated) DEVICE — SUT MONOCRYL 4-0 PS-2 27 IN Y426H

## (undated) DEVICE — SYRINGE 20ML LL

## (undated) DEVICE — TAUT CATH INTRODUCER 4.5 FR

## (undated) DEVICE — NEEDLE 22 G X 1 1/2 SAFETY

## (undated) DEVICE — ADHESIVE SKIN HIGH VISCOSITY EXOFIN 1ML

## (undated) DEVICE — GLOVE SRG BIOGEL ORTHOPEDIC 8

## (undated) DEVICE — VIAL DECANTER

## (undated) DEVICE — TROCAR: Brand: KII® SLEEVE

## (undated) DEVICE — COTTON TIP APPLICTOR 2 PK

## (undated) DEVICE — SUT VICRYL 0 UR-6 27 IN J603H

## (undated) DEVICE — Device: Brand: MEDEX

## (undated) DEVICE — DRAPE C-ARM X-RAY

## (undated) DEVICE — STOPCOCK 3-WAY

## (undated) DEVICE — HARMONIC 1100 SHEARS, 36CM SHAFT LENGTH: Brand: HARMONIC

## (undated) DEVICE — INTENDED FOR TISSUE SEPARATION, AND OTHER PROCEDURES THAT REQUIRE A SHARP SURGICAL BLADE TO PUNCTURE OR CUT.: Brand: BARD-PARKER SAFETY BLADES SIZE 11, STERILE